# Patient Record
Sex: FEMALE | Race: WHITE | NOT HISPANIC OR LATINO
[De-identification: names, ages, dates, MRNs, and addresses within clinical notes are randomized per-mention and may not be internally consistent; named-entity substitution may affect disease eponyms.]

---

## 2018-01-23 ENCOUNTER — APPOINTMENT (OUTPATIENT)
Dept: CARDIOLOGY | Facility: CLINIC | Age: 68
End: 2018-01-23
Payer: MEDICARE

## 2018-01-23 VITALS
HEIGHT: 63 IN | SYSTOLIC BLOOD PRESSURE: 98 MMHG | DIASTOLIC BLOOD PRESSURE: 68 MMHG | BODY MASS INDEX: 39.16 KG/M2 | WEIGHT: 221 LBS | RESPIRATION RATE: 16 BRPM | HEART RATE: 75 BPM

## 2018-01-23 DIAGNOSIS — Z87.19 PERSONAL HISTORY OF OTHER DISEASES OF THE DIGESTIVE SYSTEM: ICD-10-CM

## 2018-01-23 DIAGNOSIS — Z86.69 PERSONAL HISTORY OF OTHER DISEASES OF THE NERVOUS SYSTEM AND SENSE ORGANS: ICD-10-CM

## 2018-01-23 DIAGNOSIS — K59.09 OTHER CONSTIPATION: ICD-10-CM

## 2018-01-23 DIAGNOSIS — Z82.49 FAMILY HISTORY OF ISCHEMIC HEART DISEASE AND OTHER DISEASES OF THE CIRCULATORY SYSTEM: ICD-10-CM

## 2018-01-23 PROBLEM — Z00.00 ENCOUNTER FOR PREVENTIVE HEALTH EXAMINATION: Status: ACTIVE | Noted: 2018-01-23

## 2018-01-23 PROCEDURE — 93000 ELECTROCARDIOGRAM COMPLETE: CPT | Mod: 59

## 2018-01-23 PROCEDURE — 99214 OFFICE O/P EST MOD 30 MIN: CPT

## 2018-01-23 PROCEDURE — 93224 XTRNL ECG REC UP TO 48 HRS: CPT

## 2020-02-19 ENCOUNTER — APPOINTMENT (OUTPATIENT)
Dept: CARDIOLOGY | Facility: CLINIC | Age: 70
End: 2020-02-19
Payer: MEDICARE

## 2020-02-19 ENCOUNTER — NON-APPOINTMENT (OUTPATIENT)
Age: 70
End: 2020-02-19

## 2020-02-19 VITALS
HEIGHT: 63 IN | RESPIRATION RATE: 15 BRPM | SYSTOLIC BLOOD PRESSURE: 116 MMHG | DIASTOLIC BLOOD PRESSURE: 80 MMHG | BODY MASS INDEX: 41.46 KG/M2 | HEART RATE: 69 BPM | WEIGHT: 234 LBS

## 2020-02-19 DIAGNOSIS — I49.1 ATRIAL PREMATURE DEPOLARIZATION: ICD-10-CM

## 2020-02-19 PROCEDURE — 93000 ELECTROCARDIOGRAM COMPLETE: CPT

## 2020-02-19 PROCEDURE — 99214 OFFICE O/P EST MOD 30 MIN: CPT

## 2020-02-19 NOTE — REASON FOR VISIT
[FreeTextEntry1] : The patient is a 69-year-old female here today for cardiac reevaluation with known history of mild valvular insufficiency, intermittent lightheadedness, obesity and hypothyroid.  Mrs. Rodriguez reports her PCP (Dr. Ruth) advised her to follow up with her Cardiologist regarding a slightly abnormal EKG and recent complaints of worsening lightheadedness.\par \par She admits her symptoms worsen when getting up from a seated/lying down position and recently while battling a sinus cold with congestion.  She admits to sometimes feeling palpitations but are NOT associated with any other symptoms including the lightheadedness.\par \par The patient denies any associated symptoms such as CP, SOB, PND, orthopnea, syncope, edema, diaphoresis.

## 2020-02-19 NOTE — DISCUSSION/SUMMARY
[FreeTextEntry1] : 1).  Patient will complete a Transthoracic Echocardiogram in the near future to assess cardiac function and for any signs of worsening valvulopathy or cardiomyopathy.\par \par 2).  Mrs. Rodriguez reassured there is no significant change in her EKG, blood pressure and heart rate are optimally controlled and she currently appears euvolemic.\par \par Intermittent lightheadedness most likely attributable to multiple factors including orthostatic changes, dehydration and sinus congestion.  \par \par 3).  Counseled patient on moving more slowly from a seated position to standing, maintaining good PO hydration daily and to eat a well balanced diet low in carbohydrates and fats.\par \par 4).  Immediately go to the emergency department and/or report any untoward symptoms to our office.\par \par 5).  Follow up with our office in 3 months or PRN.

## 2020-02-19 NOTE — HISTORY OF PRESENT ILLNESS
[FreeTextEntry1] : Mrs. Rodriguez did suffer from a syncopal episode back in 2018 when she took too many laxatives, devloped diarrhea and became dehydrated.\par \par She subsequently completed a 24 Hour Holter monitor in (2018) that demonstrated NSR with average rate of 75 bpm (max 112, min 56).  There were only one PVC and 8 PAC's noted.  There were NO episodes of VT, SVT, pauses or AV blocks.\par \par Pharmacologic Nuclear Stress test completed in (2017) demonstrated NO exercise induced arrhythmias and NO signs of ischemia on perfusion imaging.  She did develop some lightheadedness during the exam that resolved.

## 2020-02-19 NOTE — ASSESSMENT
[FreeTextEntry1] : EKG 2/19/2020:  The EKG illustrates sinus rhythm, rate of 69 bpm, low voltage in precordial leads, poor R wave progression V1 to V4, nonspecific negative precordial T waves.\par \par Most recent blood work (1/13/2020):  Sodium (140), Potassium (4.8), Creatinine (0.98), BUN (15), Hgb (13.1), Total Cholesterol (207), LDL (127), Triglycerides (114).

## 2020-02-19 NOTE — PHYSICAL EXAM
[Normal Appearance] : normal appearance [General Appearance - In No Acute Distress] : no acute distress [FreeTextEntry1] : Morbidly obese [Normal Conjunctiva] : the conjunctiva exhibited no abnormalities [Normal Oral Mucosa] : normal oral mucosa [Normal Jugular Venous A Waves Present] : normal jugular venous A waves present [Normal Jugular Venous V Waves Present] : normal jugular venous V waves present [No Jugular Venous Sanchez A Waves] : no jugular venous sanchez A waves [] : no respiratory distress [Auscultation Breath Sounds / Voice Sounds] : lungs were clear to auscultation bilaterally [Respiration, Rhythm And Depth] : normal respiratory rhythm and effort [Heart Rate And Rhythm] : heart rate and rhythm were normal [Heart Sounds] : normal S1 and S2 [Murmurs] : no murmurs present [Edema] : no peripheral edema present [Bowel Sounds] : normal bowel sounds [Abnormal Walk] : normal gait [Nail Clubbing] : no clubbing of the fingernails [Cyanosis, Localized] : no localized cyanosis [Skin Color & Pigmentation] : normal skin color and pigmentation [Skin Turgor] : normal skin turgor [Oriented To Time, Place, And Person] : oriented to person, place, and time [Impaired Insight] : insight and judgment were intact [Affect] : the affect was normal

## 2020-05-01 ENCOUNTER — APPOINTMENT (OUTPATIENT)
Dept: CARDIOLOGY | Facility: CLINIC | Age: 70
End: 2020-05-01

## 2020-08-14 ENCOUNTER — APPOINTMENT (OUTPATIENT)
Dept: CARDIOLOGY | Facility: CLINIC | Age: 70
End: 2020-08-14
Payer: MEDICARE

## 2020-08-14 VITALS
DIASTOLIC BLOOD PRESSURE: 88 MMHG | RESPIRATION RATE: 15 BRPM | TEMPERATURE: 98 F | HEART RATE: 68 BPM | SYSTOLIC BLOOD PRESSURE: 132 MMHG | BODY MASS INDEX: 41.64 KG/M2 | WEIGHT: 235 LBS | HEIGHT: 63 IN

## 2020-08-14 PROCEDURE — 99214 OFFICE O/P EST MOD 30 MIN: CPT

## 2020-08-14 PROCEDURE — 93000 ELECTROCARDIOGRAM COMPLETE: CPT

## 2020-08-17 NOTE — PHYSICAL EXAM
[General Appearance - Well Developed] : well developed [General Appearance - In No Acute Distress] : no acute distress [Normal Conjunctiva] : the conjunctiva exhibited no abnormalities [Eyelids - No Xanthelasma] : the eyelids demonstrated no xanthelasmas [Normal Oral Mucosa] : normal oral mucosa [No Oral Pallor] : no oral pallor [No Oral Cyanosis] : no oral cyanosis [Normal Jugular Venous A Waves Present] : normal jugular venous A waves present [Normal Jugular Venous V Waves Present] : normal jugular venous V waves present [No Jugular Venous Sanchez A Waves] : no jugular venous sanchez A waves [Respiration, Rhythm And Depth] : normal respiratory rhythm and effort [Exaggerated Use Of Accessory Muscles For Inspiration] : no accessory muscle use [Auscultation Breath Sounds / Voice Sounds] : lungs were clear to auscultation bilaterally [Heart Rate And Rhythm] : heart rate and rhythm were normal [Heart Sounds] : normal S1 and S2 [Murmurs] : no murmurs present [Abdomen Soft] : soft [Abdomen Tenderness] : non-tender [Abdomen Mass (___ Cm)] : no abdominal mass palpated [Abnormal Walk] : normal gait [Gait - Sufficient For Exercise Testing] : the gait was sufficient for exercise testing [Nail Clubbing] : no clubbing of the fingernails [Cyanosis, Localized] : no localized cyanosis [Petechial Hemorrhages (___cm)] : no petechial hemorrhages [Skin Color & Pigmentation] : normal skin color and pigmentation [] : no rash [No Venous Stasis] : no venous stasis [Skin Lesions] : no skin lesions [No Skin Ulcers] : no skin ulcer [No Xanthoma] : no  xanthoma was observed [Oriented To Time, Place, And Person] : oriented to person, place, and time [Affect] : the affect was normal [Mood] : the mood was normal [No Anxiety] : not feeling anxious [FreeTextEntry1] : Morbidly obese

## 2020-08-17 NOTE — ASSESSMENT
[FreeTextEntry1] : \par \par 1.  EKG today reveals normal sinus rhythm at 68 bpm.  Poor R-wave progression across precordium.  No ischemic changes. \par \par 2.  Hyperlipidemia:  Patient with a recent lipid profile demonstrating a total cholesterol of 207, HDL 57, TC/HDL ratio 3.6, , triglycerides 114.  Patient advised on a stricter low-fat / low-cholesterol diet as well as weight loss. \par \par 3.  Intermittent lightheadedness:  Patient with history of syncope 2-3 years ago secondary to apparent dehydration.  Remains lightheaded intermittently but without further syncope.  Will have her undergo 30-day ambulatory event monitoring along with carotid duplex and an echocardiogram to further define her presenting symptoms.  Ultimately, an exercise stress test will be performed as well.    \par

## 2020-08-17 NOTE — REASON FOR VISIT
[FreeTextEntry1] : Mrs. Rodriguez is a pleasant 69-year-old white female with a past medical history significant for syncope, GERD, and thyroid cancer, who presents for follow up evaluation.   \par \par

## 2020-08-17 NOTE — HISTORY OF PRESENT ILLNESS
[FreeTextEntry1] : From a cardiac standpoint, Mrs. Rodriguez denies exertional chest pain or shortness of breath.  She was evaluated earlier this year because of complaints of intermittent lightheadedness but did not undergo any formal testing.

## 2020-10-27 ENCOUNTER — APPOINTMENT (OUTPATIENT)
Dept: CARDIOLOGY | Facility: CLINIC | Age: 70
End: 2020-10-27
Payer: MEDICARE

## 2020-10-27 PROCEDURE — 93306 TTE W/DOPPLER COMPLETE: CPT

## 2020-11-16 ENCOUNTER — NON-APPOINTMENT (OUTPATIENT)
Age: 70
End: 2020-11-16

## 2020-11-16 ENCOUNTER — APPOINTMENT (OUTPATIENT)
Dept: CARDIOLOGY | Facility: CLINIC | Age: 70
End: 2020-11-16
Payer: MEDICARE

## 2020-11-16 VITALS
SYSTOLIC BLOOD PRESSURE: 124 MMHG | DIASTOLIC BLOOD PRESSURE: 88 MMHG | TEMPERATURE: 97.3 F | HEART RATE: 65 BPM | BODY MASS INDEX: 39.51 KG/M2 | WEIGHT: 223 LBS | HEIGHT: 63 IN | RESPIRATION RATE: 16 BRPM

## 2020-11-16 PROCEDURE — 93000 ELECTROCARDIOGRAM COMPLETE: CPT

## 2020-11-16 PROCEDURE — 99214 OFFICE O/P EST MOD 30 MIN: CPT

## 2020-11-16 NOTE — HISTORY OF PRESENT ILLNESS
[FreeTextEntry1] : Mrs. Rodriguez presents today for follow up evalutation of her intermittent lightheadedness and occasional palpitations as well as results of her recent echocardiogram  She is presently without complaints of chest pain, shortness of breath, or syncope.  She does continue to have intermittent lightheadedness, however, not as frequently as before, and occasional palpitations.  States she drinks one cup of coffee daily and hydrates well throughout the day.

## 2020-11-16 NOTE — DISCUSSION/SUMMARY
[FreeTextEntry1] : Case and plan discussed with Dr. De.\par \par 1 - Intermittent lightheadedness:  Patient with history of syncope approximately 3 years ago, believed to be secondary to dehydration.  No further episodes, however, she does experience intermittent lightheadedness.  Patient will undergo 30-day ambulatory event monitoring, carotid duplex as well as a 2-day pharmacologic nuclear stress test.\par Echocardiogram (10/27/2020):  EF 60%.  Mildly dilated left atrium.  LA Volume index 35ml/ml2. Mild to moderate aortic valve sclerosis/calcification without any evidence of aortic stenosis.  Mild to moderate aortic regurgitation.  Mild mitral annular calcification.  Trace mitral valve regurgitation.  Trace pulmonic valve regurgitation.  Mildly elevated pulmonary artery systolic pressure.\par \par 2 - Follow up with Dr. De in 6-8 weeks.

## 2020-11-16 NOTE — REASON FOR VISIT
[FreeTextEntry1] : The patient is a pleasant 70-year-old white female with a past medical history significant for syncope, GERD, and thyroid cancer, who presents for follow up evaluation.

## 2020-12-29 ENCOUNTER — APPOINTMENT (OUTPATIENT)
Dept: CARDIOLOGY | Facility: CLINIC | Age: 70
End: 2020-12-29
Payer: MEDICARE

## 2020-12-29 PROCEDURE — 93880 EXTRACRANIAL BILAT STUDY: CPT

## 2021-01-08 ENCOUNTER — MED ADMIN CHARGE (OUTPATIENT)
Age: 71
End: 2021-01-08

## 2021-01-08 ENCOUNTER — APPOINTMENT (OUTPATIENT)
Dept: CARDIOLOGY | Facility: CLINIC | Age: 71
End: 2021-01-08
Payer: MEDICARE

## 2021-01-08 PROCEDURE — 78452 HT MUSCLE IMAGE SPECT MULT: CPT

## 2021-01-08 PROCEDURE — 93015 CV STRESS TEST SUPVJ I&R: CPT

## 2021-01-08 PROCEDURE — A9500: CPT

## 2021-01-08 RX ADMIN — REGADENOSON 0 MG/5ML: 0.08 INJECTION, SOLUTION INTRAVENOUS at 00:00

## 2021-01-08 RX ADMIN — AMINOPHYLLINE 0 MG/ML: 25 INJECTION, SOLUTION INTRAVENOUS at 00:00

## 2021-01-11 ENCOUNTER — APPOINTMENT (OUTPATIENT)
Dept: CARDIOLOGY | Facility: CLINIC | Age: 71
End: 2021-01-11

## 2021-01-15 ENCOUNTER — NON-APPOINTMENT (OUTPATIENT)
Age: 71
End: 2021-01-15

## 2021-01-15 ENCOUNTER — APPOINTMENT (OUTPATIENT)
Dept: CARDIOLOGY | Facility: CLINIC | Age: 71
End: 2021-01-15
Payer: MEDICARE

## 2021-01-15 VITALS
WEIGHT: 222 LBS | HEART RATE: 97 BPM | DIASTOLIC BLOOD PRESSURE: 82 MMHG | HEIGHT: 63 IN | RESPIRATION RATE: 16 BRPM | TEMPERATURE: 96.6 F | SYSTOLIC BLOOD PRESSURE: 130 MMHG | BODY MASS INDEX: 39.34 KG/M2

## 2021-01-15 DIAGNOSIS — I38 ENDOCARDITIS, VALVE UNSPECIFIED: ICD-10-CM

## 2021-01-15 DIAGNOSIS — I77.9 DISORDER OF ARTERIES AND ARTERIOLES, UNSPECIFIED: ICD-10-CM

## 2021-01-15 PROCEDURE — 93000 ELECTROCARDIOGRAM COMPLETE: CPT

## 2021-01-15 PROCEDURE — 99214 OFFICE O/P EST MOD 30 MIN: CPT

## 2021-01-15 RX ORDER — REGADENOSON 0.08 MG/ML
0.4 INJECTION, SOLUTION INTRAVENOUS
Qty: 4 | Refills: 0 | Status: COMPLETED | OUTPATIENT
Start: 2021-01-08

## 2021-01-15 RX ORDER — AMINOPHYLLINE 25 MG/ML
25 INJECTION, SOLUTION INTRAVENOUS
Qty: 0 | Refills: 0 | Status: COMPLETED | OUTPATIENT
Start: 2021-01-08

## 2021-01-15 RX ORDER — LUBIPROSTONE 24 UG/1
24 CAPSULE, GELATIN COATED ORAL
Refills: 0 | Status: DISCONTINUED | COMMUNITY
End: 2021-01-15

## 2021-01-19 NOTE — PHYSICAL EXAM
[General Appearance - Well Developed] : well developed [General Appearance - In No Acute Distress] : no acute distress [Normal Conjunctiva] : the conjunctiva exhibited no abnormalities [Eyelids - No Xanthelasma] : the eyelids demonstrated no xanthelasmas [Normal Oral Mucosa] : normal oral mucosa Patient [No Oral Pallor] : no oral pallor [No Oral Cyanosis] : no oral cyanosis [Normal Jugular Venous A Waves Present] : normal jugular venous A waves present [Normal Jugular Venous V Waves Present] : normal jugular venous V waves present [No Jugular Venous Sanchez A Waves] : no jugular venous sanchez A waves [Respiration, Rhythm And Depth] : normal respiratory rhythm and effort [Exaggerated Use Of Accessory Muscles For Inspiration] : no accessory muscle use [Auscultation Breath Sounds / Voice Sounds] : lungs were clear to auscultation bilaterally [Heart Sounds] : normal S1 and S2 [Heart Rate And Rhythm] : heart rate and rhythm were normal [Murmurs] : no murmurs present [Abdomen Soft] : soft [Abdomen Tenderness] : non-tender [Abdomen Mass (___ Cm)] : no abdominal mass palpated [Abnormal Walk] : normal gait [Gait - Sufficient For Exercise Testing] : the gait was sufficient for exercise testing [Nail Clubbing] : no clubbing of the fingernails [Cyanosis, Localized] : no localized cyanosis [Petechial Hemorrhages (___cm)] : no petechial hemorrhages [Skin Color & Pigmentation] : normal skin color and pigmentation [] : no rash [No Venous Stasis] : no venous stasis [Skin Lesions] : no skin lesions [No Xanthoma] : no  xanthoma was observed [No Skin Ulcers] : no skin ulcer [Oriented To Time, Place, And Person] : oriented to person, place, and time [Affect] : the affect was normal [Mood] : the mood was normal [No Anxiety] : not feeling anxious [FreeTextEntry1] : Morbidly obese

## 2021-01-19 NOTE — HISTORY OF PRESENT ILLNESS
[FreeTextEntry1] :  Mrs. Rodriguez presents today without complaints of exertional chest pain, shortness of breath, palpitations, lightheadedness, or syncope.  She has had intermittent lightheadedness in the past as well as palpitations and underwent a non-invasive cardiac evaluation.

## 2021-01-19 NOTE — REASON FOR VISIT
[FreeTextEntry1] : Mrs. Rodriguez is a pleasant 70-year-old white female with a past medical history significant for thyroid cancer, GERD, and syncope, who presents for follow up evaluation.\par

## 2021-01-24 DIAGNOSIS — Z01.818 ENCOUNTER FOR OTHER PREPROCEDURAL EXAMINATION: ICD-10-CM

## 2021-01-25 ENCOUNTER — APPOINTMENT (OUTPATIENT)
Dept: DISASTER EMERGENCY | Facility: CLINIC | Age: 71
End: 2021-01-25

## 2021-01-27 LAB — SARS-COV-2 N GENE NPH QL NAA+PROBE: NOT DETECTED

## 2021-02-18 RX ORDER — KIT FOR THE PREPARATION OF TECHNETIUM TC99M SESTAMIBI 1 MG/5ML
INJECTION, POWDER, LYOPHILIZED, FOR SOLUTION PARENTERAL
Refills: 0 | Status: COMPLETED | OUTPATIENT
Start: 2021-02-18

## 2021-02-18 RX ADMIN — KIT FOR THE PREPARATION OF TECHNETIUM TC99M SESTAMIBI 0: 1 INJECTION, POWDER, LYOPHILIZED, FOR SOLUTION PARENTERAL at 00:00

## 2021-03-17 RX ORDER — FLUTICASONE PROPIONATE 50 UG/1
50 SPRAY, METERED NASAL
Qty: 16 | Refills: 0 | Status: DISCONTINUED | COMMUNITY
Start: 2020-09-17 | End: 2021-03-17

## 2021-03-18 ENCOUNTER — NON-APPOINTMENT (OUTPATIENT)
Age: 71
End: 2021-03-18

## 2021-03-18 ENCOUNTER — APPOINTMENT (OUTPATIENT)
Dept: ELECTROPHYSIOLOGY | Facility: CLINIC | Age: 71
End: 2021-03-18
Payer: MEDICARE

## 2021-03-18 VITALS
WEIGHT: 220 LBS | DIASTOLIC BLOOD PRESSURE: 80 MMHG | OXYGEN SATURATION: 100 % | HEART RATE: 82 BPM | BODY MASS INDEX: 40.48 KG/M2 | HEIGHT: 62 IN | RESPIRATION RATE: 16 BRPM | SYSTOLIC BLOOD PRESSURE: 134 MMHG

## 2021-03-18 PROCEDURE — 93000 ELECTROCARDIOGRAM COMPLETE: CPT

## 2021-03-18 PROCEDURE — 99205 OFFICE O/P NEW HI 60 MIN: CPT

## 2021-03-18 NOTE — REASON FOR VISIT
[Consultation] : a consultation regarding [Supraventricular Tachycardia] : supraventricular tachycardia [Syncope] : syncope [FreeTextEntry1] : ref Dr De [Spouse] : spouse

## 2021-03-18 NOTE — HISTORY OF PRESENT ILLNESS
[FreeTextEntry1] : 70 year old woman with history of thyroid cancer, GERD, presenting for evaluation of palpitation and syncope. \par \par She has had intermittent episodes of lightheadedness, which she describes as sudden in onset, and feeling as if she would pass out. The sensations last seconds, and resolve. Over a year ago she had a severe episode after going to the bathroom that resulted in kristi syncope. She does not recall having chest pain or palpitation during these episodes.  \par \par At other times she reports episodes of palpitation, which are also brief, and feel like a fluttering.  \par \par An event monitor 11/21- 12/20/20 revealed sinus rhythm with average HR 69 bpm, and brief episodes of SVT, which were nonsustained, some of which were regular and without visible P-waves, with HRs around 154 bpm, and some nonsustained and irregular c/w brief AT/AF. She reports having had brief episodes of lightheadedness during monitoring.  \par \par A prior Holter in 1/2018 was essentially normal with rare ectopy. \par \par TTE and stress test have been unremarkable. \par \par She has not been on medical therapy for these issues.  \par \par

## 2021-03-18 NOTE — PHYSICAL EXAM
[General Appearance - Well Developed] : well developed [Well Groomed] : well groomed [General Appearance - In No Acute Distress] : no acute distress [FreeTextEntry1] : obese [Normal Conjunctiva] : the conjunctiva exhibited no abnormalities [Eyelids - No Xanthelasma] : the eyelids demonstrated no xanthelasmas [Normal Oral Mucosa] : normal oral mucosa [No Oral Pallor] : no oral pallor [No Oral Cyanosis] : no oral cyanosis [Normal Jugular Venous A Waves Present] : normal jugular venous A waves present [Normal Jugular Venous V Waves Present] : normal jugular venous V waves present [No Jugular Venous Sanchez A Waves] : no jugular venous sanchez A waves [Heart Rate And Rhythm] : heart rate and rhythm were normal [Heart Sounds] : normal S1 and S2 [Murmurs] : no murmurs present [Respiration, Rhythm And Depth] : normal respiratory rhythm and effort [Exaggerated Use Of Accessory Muscles For Inspiration] : no accessory muscle use [Auscultation Breath Sounds / Voice Sounds] : lungs were clear to auscultation bilaterally [Abdomen Soft] : soft [Abdomen Tenderness] : non-tender [Abdomen Mass (___ Cm)] : no abdominal mass palpated [Abnormal Walk] : normal gait [Gait - Sufficient For Exercise Testing] : the gait was sufficient for exercise testing [Nail Clubbing] : no clubbing of the fingernails [Cyanosis, Localized] : no localized cyanosis [Petechial Hemorrhages (___cm)] : no petechial hemorrhages [Skin Color & Pigmentation] : normal skin color and pigmentation [] : no rash [No Venous Stasis] : no venous stasis [Skin Lesions] : no skin lesions [No Skin Ulcers] : no skin ulcer [No Xanthoma] : no  xanthoma was observed [Oriented To Time, Place, And Person] : oriented to person, place, and time [Affect] : the affect was normal [Mood] : the mood was normal [No Anxiety] : not feeling anxious

## 2021-03-18 NOTE — DISCUSSION/SUMMARY
[FreeTextEntry1] : 70 year old woman with history of thyroid cancer, GERD, presenting for evaluation of palpitation and syncope. She has had episodes of sudden lightheadedness with presyncope and syncope in the past, as well as episodes of brief palpitation. Monitoring has revealed short episodes of SVT, which may be a reentrant arrhythmia, but paroxysmal atrial fibrillation is also suspected, and her more severe symptoms did not occur on monitoring. Given the severity of her symptoms including syncope, further evaluation is indicated to clarify if she has an underlying arrhythmia causing her symptoms, and to guide management. If an SVT is clarified, EP Study and ablation may be her best option, vs consideration of medical therapy if tolerated. In addition, if she does have AF, anticoagulation is indicated in addition to symptomatic management. We did discuss the potential for EP study to clarify, but she would prefer further monitoring to clarify the diagnosis prior to invasive procedure. An ILR implant would be most useful for long-term monitoring, to correlate with symptoms, and guide further management. The risks and benefits of ILR implant were reviewed, and she expressed understanding.  \par \par -ILR implant.  \par \par -EP followup after above.

## 2021-04-02 ENCOUNTER — APPOINTMENT (OUTPATIENT)
Dept: DISASTER EMERGENCY | Facility: CLINIC | Age: 71
End: 2021-04-02

## 2021-04-03 LAB — SARS-COV-2 N GENE NPH QL NAA+PROBE: NOT DETECTED

## 2021-04-05 ENCOUNTER — TRANSCRIPTION ENCOUNTER (OUTPATIENT)
Age: 71
End: 2021-04-05

## 2021-04-05 ENCOUNTER — OUTPATIENT (OUTPATIENT)
Dept: OUTPATIENT SERVICES | Facility: HOSPITAL | Age: 71
LOS: 1 days | Discharge: HOME | End: 2021-04-05
Payer: MEDICARE

## 2021-04-05 VITALS
DIASTOLIC BLOOD PRESSURE: 78 MMHG | TEMPERATURE: 98 F | SYSTOLIC BLOOD PRESSURE: 136 MMHG | RESPIRATION RATE: 16 BRPM | HEART RATE: 69 BPM

## 2021-04-05 DIAGNOSIS — I47.1 SUPRAVENTRICULAR TACHYCARDIA: ICD-10-CM

## 2021-04-05 PROCEDURE — C1764: CPT

## 2021-04-05 PROCEDURE — 33285 INSJ SUBQ CAR RHYTHM MNTR: CPT

## 2021-04-05 RX ORDER — CEPHALEXIN 500 MG
500 CAPSULE ORAL ONCE
Refills: 0 | Status: COMPLETED | OUTPATIENT
Start: 2021-04-05 | End: 2021-04-05

## 2021-04-05 RX ORDER — LEVOTHYROXINE SODIUM 125 MCG
1 TABLET ORAL
Qty: 0 | Refills: 0 | DISCHARGE

## 2021-04-05 RX ADMIN — Medication 500 MILLIGRAM(S): at 08:00

## 2021-04-05 NOTE — DISCHARGE NOTE NURSING/CASE MANAGEMENT/SOCIAL WORK - PATIENT PORTAL LINK FT
You can access the FollowMyHealth Patient Portal offered by [facility] by registering at the following website: [email or web address]. By joining [facility]’s FollowMyHealth portal, you will also be able to view your health information using other applications (apps) compatible with our system.

## 2021-04-05 NOTE — DISCHARGE NOTE PROVIDER - NSDCFUSCHEDAPPT_GEN_ALL_CORE_FT
MARGRET AUGUSTE ; 04/16/2021 ; NPP Cardiology [address]  MARGRET AUGUSTE ; 04/18/2021 ; [place] [address] [facility]

## 2021-04-05 NOTE — DISCHARGE NOTE PROVIDER - NSDCFUADDINST_GEN_ALL_CORE_FT
Follow up with Dr. Polo in two weeks time. The office will call you with an appointment in 3-5 days time.

## 2021-04-05 NOTE — H&P PST ADULT - HISTORY OF PRESENT ILLNESS
70 year old woman with history of thyroid cancer, GERD, who presents with complaints of palpitations and syncope. She has had intermittent episodes of lightheadedness, which she describes as sudden in onset, and feeling as if she would pass out. The sensations last seconds, and resolve. Over a year ago she had a severe episode after going to the bathroom that resulted in kristi syncope. She does not recall having chest pain or palpitation during these episodes. At other times she reports episodes of palpitation, which are also brief, and feel like a fluttering. Event monitor worn from 11/21- 12/20/20 revealed sinus rhythm with average HR 69 bpm, and brief episodes of SVT, which were nonsustained, some of which were regular and without visible P-waves, with HRs around 154 bpm, and some nonsustained and irregular c/w brief AT/AF. She reports having had brief episodes of lightheadedness during monitoring. She has not been on medical therapy for these issues.      Cardiology Summary  Stress Test: 1/8/21, normal perfusion, LVEF 68%   Echo: 10/27/20, LVEF 60%, mild LA dilatation, mild-mod AV sclerosis, no AS, mild-mod AI, trace MR

## 2021-04-05 NOTE — DISCHARGE NOTE PROVIDER - HOSPITAL COURSE
70 year old woman with history of thyroid cancer, GERD, who presents with complaints of palpitations and syncope who is s/p successful loop recorder insertion.

## 2021-04-05 NOTE — DISCHARGE NOTE PROVIDER - CARE PROVIDER_API CALL
Juan C Polo)  Cardiology; Internal Medicine  [address], [address]  [place] [address]  Phone: [phone]  Fax: [phone]  Established Patient  Follow Up Time: 2 weeks    Cr De)  Cardiovascular Disease; Internal Medicine  [address] [place] [address]  Phone: [phone]  Fax: [phone]  Established Patient  Follow Up Time: Routine    Geovanni Ruth (DO)  Internal Medicine  [address], [address]  [place] [address]  Phone: [phone]  Fax: [phone]  Established Patient  Follow Up Time: Routine

## 2021-04-05 NOTE — H&P PST ADULT - ASSESSMENT
70 year old woman with history of thyroid cancer, GERD, who presents with complaints of palpitations and syncope who presents for elective loop recorder insertion    - COVID negative on 4/2/21

## 2021-04-05 NOTE — DISCHARGE NOTE PROVIDER - CARE PROVIDERS DIRECT ADDRESSES
,[email or web address]

## 2021-04-05 NOTE — DISCHARGE NOTE PROVIDER - PROVIDER TOKENS
PROVIDER:[TOKEN:[[id number]],FOLLOWUP:[2 weeks],ESTABLISHEDPATIENT:[T]],PROVIDER:[TOKEN:[[id number]],FOLLOWUP:[Routine],ESTABLISHEDPATIENT:[T]],PROVIDER:[TOKEN:[[id number]],FOLLOWUP:[Routine],ESTABLISHEDPATIENT:[T]]

## 2021-04-08 PROBLEM — K21.9 GASTRO-ESOPHAGEAL REFLUX DISEASE WITHOUT ESOPHAGITIS: Chronic | Status: ACTIVE | Noted: 2021-04-05

## 2021-04-08 PROBLEM — C73 MALIGNANT NEOPLASM OF THYROID GLAND: Chronic | Status: ACTIVE | Noted: 2021-04-05

## 2021-04-14 DIAGNOSIS — R00.2 PALPITATIONS: ICD-10-CM

## 2021-04-16 ENCOUNTER — APPOINTMENT (OUTPATIENT)
Dept: CARDIOLOGY | Facility: CLINIC | Age: 71
End: 2021-04-16
Payer: MEDICARE

## 2021-04-16 VITALS
RESPIRATION RATE: 16 BRPM | WEIGHT: 222 LBS | DIASTOLIC BLOOD PRESSURE: 78 MMHG | SYSTOLIC BLOOD PRESSURE: 118 MMHG | TEMPERATURE: 97.9 F | HEIGHT: 62 IN | BODY MASS INDEX: 40.85 KG/M2 | OXYGEN SATURATION: 98 % | HEART RATE: 71 BPM

## 2021-04-16 PROCEDURE — 99214 OFFICE O/P EST MOD 30 MIN: CPT

## 2021-04-16 PROCEDURE — 93000 ELECTROCARDIOGRAM COMPLETE: CPT

## 2021-04-18 ENCOUNTER — APPOINTMENT (OUTPATIENT)
Dept: DISASTER EMERGENCY | Facility: CLINIC | Age: 71
End: 2021-04-18

## 2021-04-19 LAB — SARS-COV-2 N GENE NPH QL NAA+PROBE: NOT DETECTED

## 2021-04-20 ENCOUNTER — NON-APPOINTMENT (OUTPATIENT)
Age: 71
End: 2021-04-20

## 2021-04-20 ENCOUNTER — APPOINTMENT (OUTPATIENT)
Dept: ELECTROPHYSIOLOGY | Facility: CLINIC | Age: 71
End: 2021-04-20
Payer: MEDICARE

## 2021-04-20 VITALS
RESPIRATION RATE: 17 BRPM | SYSTOLIC BLOOD PRESSURE: 124 MMHG | BODY MASS INDEX: 40.85 KG/M2 | TEMPERATURE: 97.8 F | OXYGEN SATURATION: 97 % | WEIGHT: 222 LBS | HEART RATE: 69 BPM | DIASTOLIC BLOOD PRESSURE: 80 MMHG | HEIGHT: 62 IN

## 2021-04-20 PROCEDURE — 99213 OFFICE O/P EST LOW 20 MIN: CPT

## 2021-04-20 NOTE — DISCUSSION/SUMMARY
[FreeTextEntry1] : Ms. Rodriguez is a 70 y/f with history of thyroid CA, with recurrent episodes of palpitations, lightheadedness, and syncope. An event monitor 12/2020 revealed brief episodes of SVT, and possible brief PAF, which did not correlate with symptoms. She is now s/p ILR implant for ongoing arrhythmia monitoring to correlate with symptoms, evaluate for arrhythmic etiology of syncope, and guide further management on 4/5/21. \par \par Since procedure she reports she has been feeling well but has been experiencing lightheadedness which has been going on for years. Symptom recordings to date correlate with SR, ST 100s. No other events noted since implant. \par \par Recommendation: \par \par -Site care, procedure for making symptom recordings,and remote follow up reviewed. Currently enrolled in our clinic, will d/w Dr.San watkins's office. \par -EP follow up in 6 months or sooner PRN.\par \par Danielle Soliz ANP-C

## 2021-04-20 NOTE — REVIEW OF SYSTEMS
[Headache] : no headache [Feeling Fatigued] : not feeling fatigued [Shortness Of Breath] : no shortness of breath [Dyspnea on exertion] : not dyspnea during exertion [Chest  Pressure] : no chest pressure [Chest Pain] : no chest pain [Lower Ext Edema] : no extremity edema [Palpitations] : no palpitations [Dizziness] : dizziness

## 2021-04-20 NOTE — HISTORY OF PRESENT ILLNESS
[FreeTextEntry1] : From a cardiac standpoint, Mrs. Tiwari remains reasonably stable denying chest pain, shortness of breath, or further syncope.  She has undergone 30-day ambulatory event monitoring which revealed short bursts of SVT.  She recently underwent implantable loop recorder in order to better assess her underlying rhythm.  Patient does admit to intermittent episodes of palpitations and lightheadedness which do not appear to be related to exertion and can occur at rest.  Symptoms last only a few seconds at a time.

## 2021-04-20 NOTE — REASON FOR VISIT
[FreeTextEntry1] : Mrs. Rodriguez is a pleasant 70-year-old white female with a past medical history significant for thyroid cancer, GERD, and syncope, who presents for follow up evaluation.  \par \par

## 2021-04-20 NOTE — HISTORY OF PRESENT ILLNESS
[de-identified] : Ms. Rodriguez is a 70 y/f with history of thyroid CA, with recurrent episodes of palpitations, lightheadedness, and syncope. An event monitor 12/2020 revealed brief episodes of SVT, and possible brief PAF, which did not correlate with symptoms. She is now s/p ILR implant for ongoing arrhythmia monitoring to correlate with symptoms, evaluate for arrhythmic etiology of syncope, and guide further management on 4/5/21. \par \par Since procedure she reports she has been feeling well but has been experiencing lightheadedness which has been going on for years. Symptom recordings to date correlate with SR, ST 100s. No other events noted since implant. \par \par Ref: Dr. De

## 2021-04-20 NOTE — PHYSICAL EXAM
[General Appearance - Well Developed] : well developed [Clean] : clean [Dry] : dry [Healing Well] : healing well [FreeTextEntry1] : Left parasternal

## 2021-04-20 NOTE — ASSESSMENT
[FreeTextEntry1] : 1.  EKG today reveals sinus rhythm with APCs at 71 bpm.  Normal intervals.  Poor R-wave progression leads V1 through V3.  No ischemic changes.  \par \par 2.  Palpitations/syncope:  Patient recently received an implanted loop recorder and is now being monitored closely by EP regarding her arrhythmia.  She is advised to continue current medications as well as follow a sensible diet and walk in order to lose weight.  If clinically stable, office visit three months. \par

## 2021-05-12 ENCOUNTER — APPOINTMENT (OUTPATIENT)
Dept: ELECTROPHYSIOLOGY | Facility: CLINIC | Age: 71
End: 2021-05-12

## 2021-05-12 ENCOUNTER — APPOINTMENT (OUTPATIENT)
Dept: CARDIOLOGY | Facility: CLINIC | Age: 71
End: 2021-05-12
Payer: MEDICARE

## 2021-05-12 PROCEDURE — 93298 REM INTERROG DEV EVAL SCRMS: CPT

## 2021-05-12 PROCEDURE — G2066: CPT

## 2021-06-16 ENCOUNTER — APPOINTMENT (OUTPATIENT)
Dept: ELECTROPHYSIOLOGY | Facility: CLINIC | Age: 71
End: 2021-06-16

## 2021-06-16 ENCOUNTER — APPOINTMENT (OUTPATIENT)
Dept: CARDIOLOGY | Facility: CLINIC | Age: 71
End: 2021-06-16
Payer: MEDICARE

## 2021-06-16 PROCEDURE — G2066: CPT

## 2021-06-16 PROCEDURE — 93298 REM INTERROG DEV EVAL SCRMS: CPT

## 2021-07-13 ENCOUNTER — APPOINTMENT (OUTPATIENT)
Dept: CARDIOLOGY | Facility: CLINIC | Age: 71
End: 2021-07-13
Payer: MEDICARE

## 2021-07-13 VITALS
HEIGHT: 63 IN | RESPIRATION RATE: 15 BRPM | BODY MASS INDEX: 39.16 KG/M2 | SYSTOLIC BLOOD PRESSURE: 106 MMHG | WEIGHT: 221 LBS | DIASTOLIC BLOOD PRESSURE: 72 MMHG | HEART RATE: 66 BPM

## 2021-07-13 PROCEDURE — 93000 ELECTROCARDIOGRAM COMPLETE: CPT

## 2021-07-13 PROCEDURE — 99214 OFFICE O/P EST MOD 30 MIN: CPT

## 2021-07-15 NOTE — PHYSICAL EXAM
[General Appearance - Well Developed] : well developed [General Appearance - In No Acute Distress] : no acute distress [Normal Conjunctiva] : the conjunctiva exhibited no abnormalities [Eyelids - No Xanthelasma] : the eyelids demonstrated no xanthelasmas [Normal Oral Mucosa] : normal oral mucosa [No Oral Pallor] : no oral pallor [No Oral Cyanosis] : no oral cyanosis [Normal Jugular Venous A Waves Present] : normal jugular venous A waves present [Normal Jugular Venous V Waves Present] : normal jugular venous V waves present [No Jugular Venous Sanchez A Waves] : no jugular venous sanhcez A waves [Respiration, Rhythm And Depth] : normal respiratory rhythm and effort [Exaggerated Use Of Accessory Muscles For Inspiration] : no accessory muscle use [Auscultation Breath Sounds / Voice Sounds] : lungs were clear to auscultation bilaterally [Heart Rate And Rhythm] : heart rate and rhythm were normal [Heart Sounds] : normal S1 and S2 [Murmurs] : no murmurs present [Abdomen Soft] : soft [Abdomen Tenderness] : non-tender [Abdomen Mass (___ Cm)] : no abdominal mass palpated [Abnormal Walk] : normal gait [Gait - Sufficient For Exercise Testing] : the gait was sufficient for exercise testing [Nail Clubbing] : no clubbing of the fingernails [Cyanosis, Localized] : no localized cyanosis [Petechial Hemorrhages (___cm)] : no petechial hemorrhages [Skin Color & Pigmentation] : normal skin color and pigmentation [] : no rash [No Venous Stasis] : no venous stasis [Skin Lesions] : no skin lesions [No Skin Ulcers] : no skin ulcer [No Xanthoma] : no  xanthoma was observed [Oriented To Time, Place, And Person] : oriented to person, place, and time [Affect] : the affect was normal [Mood] : the mood was normal [No Anxiety] : not feeling anxious [FreeTextEntry1] : Morbidly obese

## 2021-07-15 NOTE — REASON FOR VISIT
[FreeTextEntry1] : Mrs. Rodriguez is a pleasant 70-year-old white female with a past medical history significant for thyroid cancer, GERD, and episodes of palpitations associated with lightheadedness and syncope.  She underwent event monitoring in December of last year which revealed brief episodes of SVT and possibly paroxysmal atrial fibrillation.  Since that time, she has undergone implantation of a loop recorder which is currently monitoring her rhythm.  \par \par

## 2021-07-15 NOTE — ASSESSMENT
[FreeTextEntry1] : 1.  EKG today reveals sinus rhythm at 66 bpm with sinus arrhythmia.  Poor R-wave progression leads V1 through V4.  No ischemic changes. \par \par 2.  Palpitations, lightheadedness, or syncope:  Clinically stable at this point.  Event monitor suggestive of SVT and possibly PAF.  Loop recorder to date has not revealed significant arrhythmia.  Patient will continue to be monitored closely.  \par \par 3.  Review of recent lipid profile demonstrates a total cholesterol of 213, HDL 53, TC/HDL ratio 4.0, , triglycerides 137.  Patient is advised on a stricter low-fat / low-cholesterol diet.  Fasting bloodwork prior to next office visit.    \par

## 2021-07-15 NOTE — HISTORY OF PRESENT ILLNESS
[FreeTextEntry1] : From a cardiac standpoint, Mrs. Rodriguez remains reasonably stable denying chest pain, shortness of breath, or other symptoms.  Her loop recorder to date has been relatively “arrhythmia silent.”

## 2021-07-21 ENCOUNTER — APPOINTMENT (OUTPATIENT)
Dept: ELECTROPHYSIOLOGY | Facility: CLINIC | Age: 71
End: 2021-07-21

## 2021-07-21 ENCOUNTER — APPOINTMENT (OUTPATIENT)
Dept: CARDIOLOGY | Facility: CLINIC | Age: 71
End: 2021-07-21
Payer: MEDICARE

## 2021-07-21 PROCEDURE — G2066: CPT

## 2021-07-21 PROCEDURE — 93298 REM INTERROG DEV EVAL SCRMS: CPT

## 2021-08-25 ENCOUNTER — APPOINTMENT (OUTPATIENT)
Dept: ELECTROPHYSIOLOGY | Facility: CLINIC | Age: 71
End: 2021-08-25

## 2021-08-27 ENCOUNTER — APPOINTMENT (OUTPATIENT)
Dept: CARDIOLOGY | Facility: CLINIC | Age: 71
End: 2021-08-27
Payer: MEDICARE

## 2021-08-27 PROCEDURE — G2066: CPT

## 2021-08-27 PROCEDURE — 93298 REM INTERROG DEV EVAL SCRMS: CPT

## 2021-09-29 ENCOUNTER — APPOINTMENT (OUTPATIENT)
Dept: CARDIOLOGY | Facility: CLINIC | Age: 71
End: 2021-09-29
Payer: MEDICARE

## 2021-09-29 PROCEDURE — 93298 REM INTERROG DEV EVAL SCRMS: CPT

## 2021-09-29 PROCEDURE — G2066: CPT

## 2021-10-13 ENCOUNTER — APPOINTMENT (OUTPATIENT)
Dept: CARDIOLOGY | Facility: CLINIC | Age: 71
End: 2021-10-13
Payer: MEDICARE

## 2021-10-13 VITALS
BODY MASS INDEX: 37.03 KG/M2 | HEART RATE: 64 BPM | RESPIRATION RATE: 16 BRPM | SYSTOLIC BLOOD PRESSURE: 102 MMHG | DIASTOLIC BLOOD PRESSURE: 60 MMHG | HEIGHT: 63 IN | WEIGHT: 209 LBS

## 2021-10-13 DIAGNOSIS — Z85.850 PERSONAL HISTORY OF MALIGNANT NEOPLASM OF THYROID: ICD-10-CM

## 2021-10-13 PROCEDURE — 99214 OFFICE O/P EST MOD 30 MIN: CPT

## 2021-10-13 PROCEDURE — 93000 ELECTROCARDIOGRAM COMPLETE: CPT

## 2021-10-15 NOTE — ASSESSMENT
[FreeTextEntry1] : 1.  EKG today demonstrates sinus rhythm with marked sinus node arrhythmia at 64 bpm.  Normal intervals.  Poor R-wave progression across leads V1 through V3.  No ischemic changes.  \par \par 2.  SVT (? PAF):  Patient currently wearing an implanted loop recorder which is monitoring her heart rhythm.  To date, there has been no evidence of atrial fibrillation.  Patient continues to be monitored.  The implant date on the loop is April 2021, so she will likely wear this for the next 2 ½ years.   \par \par 3.  Review of recent bloodwork did not include a lipid profile.  Patient advised on a strict low-fat / low-cholesterol diet.  Will undergo fasting lipid profile in the next few months prior to her return office visit in three months. \par

## 2021-10-15 NOTE — REASON FOR VISIT
[FreeTextEntry1] : Mrs. Rodriguez is a pleasant 71-year-old white female with a past medical history significant for thyroid cancer, GERD, palpitations associated with lightheadedness and syncope for which she currently has an implanted loop recorder and is actively being monitored.  Patient has had brief episodes of SVT and possibly paroxysms of atrial fibrillation. \par

## 2021-10-15 NOTE — HISTORY OF PRESENT ILLNESS
[FreeTextEntry1] : \par From a cardiac standpoint, Mrs. Rodriguez denies exertional chest pain, shortness of breath, palpitations, lightheadedness, or syncope.  \par

## 2021-10-21 ENCOUNTER — APPOINTMENT (OUTPATIENT)
Dept: ELECTROPHYSIOLOGY | Facility: CLINIC | Age: 71
End: 2021-10-21
Payer: MEDICARE

## 2021-10-21 ENCOUNTER — NON-APPOINTMENT (OUTPATIENT)
Age: 71
End: 2021-10-21

## 2021-10-21 VITALS
HEIGHT: 63 IN | TEMPERATURE: 97.7 F | HEART RATE: 71 BPM | OXYGEN SATURATION: 96 % | RESPIRATION RATE: 16 BRPM | WEIGHT: 215 LBS | SYSTOLIC BLOOD PRESSURE: 115 MMHG | DIASTOLIC BLOOD PRESSURE: 60 MMHG | BODY MASS INDEX: 38.09 KG/M2

## 2021-10-21 PROCEDURE — 93000 ELECTROCARDIOGRAM COMPLETE: CPT

## 2021-10-21 PROCEDURE — 99214 OFFICE O/P EST MOD 30 MIN: CPT

## 2021-10-21 NOTE — HISTORY OF PRESENT ILLNESS
[de-identified] : Ms. Rodriguez is a 71 y/f with history of thyroid CA, with recurrent episodes of palpitations, lightheadedness, and syncope. An event monitor 12/2020 revealed brief episodes of SVT, and possible brief PAF, which did not correlate with symptoms. She is now s/p ILR implant for ongoing arrhythmia monitoring to correlate with symptoms, evaluate for arrhythmic etiology of syncope, and guide further management on 4/5/21. \par \par Remote followed by Dr. De. ILR has revealed many symptom recordings of lightheadedness have correlated with SR with rare APCs.  On two occasions there have been brief bursts of probable PAT one slightly irregular cannot rule out PAF- max 3 seconds in duration. \par \par Ref: Dr. De.

## 2021-10-21 NOTE — ADDENDUM
[FreeTextEntry1] : I was present for the above evaluation and agree with the history, physical exam, assessment and plan as above.  brief episodes of tachycardia c/w pAF, but all very brief thus far. as above if more sustained episodes would warrant anticoagulation. given low BP, will defer medical therapy for now unless episodes progress.

## 2021-10-21 NOTE — DISCUSSION/SUMMARY
[FreeTextEntry1] : Ms. Rodriguez is a 71 y/f with history of thyroid CA, with recurrent episodes of palpitations, lightheadedness, and syncope. An event monitor 12/2020 revealed brief episodes of SVT, and possible brief PAF, which did not correlate with symptoms. She is now s/p ILR implant for ongoing arrhythmia monitoring to correlate with symptoms, evaluate for arrhythmic etiology of syncope, and guide further management on 4/5/21. \par \par Remote followed by Dr. De. ILR has revealed many symptom recordings of lightheadedness have correlated with SR with rare APCs.  On two occasions there have been brief bursts of probable PAT one slightly irregular cannot rule out PAF- max 3 seconds in duration. \par \par Recommendation: \par \par -Ms. Rodriguez is doing well with most symptoms of lightheadedness correlating with SR rare APCs. On two occaions there have been brief bursts of probable SVT one slightly irregular cannot rule out PAF, up to 3 seconds in duration. Due to brief nature of episodes to continue monitoring. If more sustained PAF noted, to consider AC initiation. If more frequent or sustained episodes of SVT could consider beta blocker initiation, will defer at this time due to low normal BP. \par -Continue neurology w/u for lightheadedness and cardiology f/u with Dr. De. \par -EP follow up PRN.\par \par Danielle Soliz ANP-C

## 2021-11-03 ENCOUNTER — APPOINTMENT (OUTPATIENT)
Dept: CARDIOLOGY | Facility: CLINIC | Age: 71
End: 2021-11-03
Payer: MEDICARE

## 2021-11-03 PROCEDURE — G2066: CPT

## 2021-11-03 PROCEDURE — 93298 REM INTERROG DEV EVAL SCRMS: CPT | Mod: NC

## 2021-12-08 ENCOUNTER — APPOINTMENT (OUTPATIENT)
Dept: CARDIOLOGY | Facility: CLINIC | Age: 71
End: 2021-12-08
Payer: MEDICARE

## 2021-12-08 PROCEDURE — 93298 REM INTERROG DEV EVAL SCRMS: CPT

## 2021-12-08 PROCEDURE — G2066: CPT

## 2022-01-12 ENCOUNTER — APPOINTMENT (OUTPATIENT)
Dept: CARDIOLOGY | Facility: CLINIC | Age: 72
End: 2022-01-12
Payer: MEDICARE

## 2022-01-12 PROCEDURE — 93298 REM INTERROG DEV EVAL SCRMS: CPT

## 2022-01-12 PROCEDURE — G2066: CPT

## 2022-02-16 ENCOUNTER — APPOINTMENT (OUTPATIENT)
Dept: CARDIOLOGY | Facility: CLINIC | Age: 72
End: 2022-02-16
Payer: MEDICARE

## 2022-02-16 PROCEDURE — G2066: CPT

## 2022-02-16 PROCEDURE — 93298 REM INTERROG DEV EVAL SCRMS: CPT

## 2022-03-23 ENCOUNTER — APPOINTMENT (OUTPATIENT)
Dept: CARDIOLOGY | Facility: CLINIC | Age: 72
End: 2022-03-23
Payer: MEDICARE

## 2022-03-23 PROCEDURE — G2066: CPT

## 2022-03-23 PROCEDURE — 93298 REM INTERROG DEV EVAL SCRMS: CPT

## 2022-03-31 ENCOUNTER — RESULT CHARGE (OUTPATIENT)
Age: 72
End: 2022-03-31

## 2022-04-01 ENCOUNTER — APPOINTMENT (OUTPATIENT)
Dept: CARDIOLOGY | Facility: CLINIC | Age: 72
End: 2022-04-01
Payer: MEDICARE

## 2022-04-01 VITALS
SYSTOLIC BLOOD PRESSURE: 140 MMHG | BODY MASS INDEX: 39.93 KG/M2 | RESPIRATION RATE: 16 BRPM | DIASTOLIC BLOOD PRESSURE: 42 MMHG | HEIGHT: 62 IN | HEART RATE: 63 BPM | WEIGHT: 217 LBS

## 2022-04-01 VITALS — SYSTOLIC BLOOD PRESSURE: 126 MMHG | DIASTOLIC BLOOD PRESSURE: 70 MMHG

## 2022-04-01 PROCEDURE — 99213 OFFICE O/P EST LOW 20 MIN: CPT

## 2022-04-01 PROCEDURE — 93000 ELECTROCARDIOGRAM COMPLETE: CPT

## 2022-04-06 NOTE — ASSESSMENT
[FreeTextEntry1] : 1.  EKG today reveals sinus rhythm at 63 bpm.  APCs.  Poor R-wave progression leads V1 through V4.  In no acute ischemic changes.  \par \par 2.  Palpitations:  Patient with sporadic short bursts of palpitations.  Loop recorder to date has shown very little.  There has been on short episode of SVT.  No atrial fibrillation detected.  Patient currently uncomfortable with the position of the loop recorder and will discuss the possibility of extraction when she sees EP in two months.  \par \par 3.  Chronic obesity:  Patient advised on a strict carbohydrate and walking program in order to gradually lose weight.  If clinically stable, office visit four months.  \par

## 2022-04-06 NOTE — HISTORY OF PRESENT ILLNESS
[FreeTextEntry1] : From a cardiac standpoint, Mrs. Rodriguez offers few complaints at this time other than an occasional bout of palpitations.  There has been no lightheadedness or syncope.  Patient states that her loop recorder is migrating downward and beginning to bother her.  She will discuss early removal in June when she sees electrophysiology.  \par \par

## 2022-04-06 NOTE — REASON FOR VISIT
[FreeTextEntry1] : Mrs. Rodriguez is a pleasant 71-year-old obese white female with a past medical history significant for thyroid cancer, GERD, palpitations associated with lightheadedness and syncope with known SVT and questionable paroxysms of atrial fibrillation status-post implantable loop recorder, who presents for follow up. \par

## 2022-04-27 ENCOUNTER — APPOINTMENT (OUTPATIENT)
Dept: CARDIOLOGY | Facility: CLINIC | Age: 72
End: 2022-04-27
Payer: MEDICARE

## 2022-04-27 PROCEDURE — 93298 REM INTERROG DEV EVAL SCRMS: CPT

## 2022-04-27 PROCEDURE — G2066: CPT

## 2022-06-01 ENCOUNTER — APPOINTMENT (OUTPATIENT)
Dept: CARDIOLOGY | Facility: CLINIC | Age: 72
End: 2022-06-01
Payer: MEDICARE

## 2022-06-01 PROCEDURE — G2066: CPT

## 2022-06-01 PROCEDURE — 93298 REM INTERROG DEV EVAL SCRMS: CPT

## 2022-06-16 ENCOUNTER — APPOINTMENT (OUTPATIENT)
Dept: ELECTROPHYSIOLOGY | Facility: CLINIC | Age: 72
End: 2022-06-16
Payer: MEDICARE

## 2022-06-16 ENCOUNTER — NON-APPOINTMENT (OUTPATIENT)
Age: 72
End: 2022-06-16

## 2022-06-16 VITALS
OXYGEN SATURATION: 97 % | SYSTOLIC BLOOD PRESSURE: 98 MMHG | TEMPERATURE: 97.7 F | HEIGHT: 62 IN | WEIGHT: 220 LBS | HEART RATE: 71 BPM | BODY MASS INDEX: 40.48 KG/M2 | DIASTOLIC BLOOD PRESSURE: 62 MMHG

## 2022-06-16 PROCEDURE — 93000 ELECTROCARDIOGRAM COMPLETE: CPT

## 2022-06-16 PROCEDURE — 99214 OFFICE O/P EST MOD 30 MIN: CPT

## 2022-06-16 NOTE — HISTORY OF PRESENT ILLNESS
[de-identified] : Ms. Rodriguez is a 71 y/f with history of thyroid CA, with recurrent episodes of palpitations, lightheadedness, and syncope. An event monitor 12/2020 revealed brief episodes of SVT, and possible brief PAF, which did not correlate with symptoms. She is now s/p ILR implant for ongoing arrhythmia monitoring to correlate with symptoms, evaluate for arrhythmic etiology of syncope, and guide further management on 4/5/21. \par \par Remote followed by Dr. Almodovar. ILR has revealed many symptom recordings of lightheadedness have correlated with SR with rare APCs. On two occasions there have been brief bursts of probable PAT one slightly irregular cannot rule out PAF- max 3 seconds in duration. \par \par During f/u with Dr. Almodovar she reported that her ILR was migrating downward and beginning to bother her. Presents today to discuss explant. \par \par Today, she reports she has overall felt well since last follow up. Brief palpitations which have not been highly bothersome. Intermittent lightheadedness has continued, resolves after lying down for a few minutes.  No correlating arrhythmias on ILR. Symptom recordings have correlated with SR with few APCs. She does express ILR explant due to mild tenderness. \par \par Ref: Dr. Almodovar.

## 2022-06-16 NOTE — PHYSICAL EXAM
pt was bitten by cat on Wed pt L hand is red and swollen. pt states its his house cat and never got shots. pt denies medical problems. pt denies cough fever or SOB pt VSS NAD [General Appearance - Well Nourished] : well nourished [General Appearance - Well Developed] : well developed [Heart Rate And Rhythm] : heart rate and rhythm were normal [Heart Sounds] : normal S1 and S2 [Murmurs] : no murmurs present [Arterial Pulses Normal] : the arterial pulses were normal [Edema] : no peripheral edema present [] : no respiratory distress

## 2022-06-16 NOTE — REVIEW OF SYSTEMS
[Palpitations] : palpitations [Headache] : no headache [Feeling Fatigued] : not feeling fatigued [SOB] : no shortness of breath [Dyspnea on exertion] : not dyspnea during exertion [Chest Discomfort] : no chest discomfort [Lower Ext Edema] : no extremity edema [Orthopnea] : no orthopnea [Syncope] : no syncope [Dizziness] : no dizziness [de-identified] : Lightheadedness

## 2022-06-16 NOTE — DISCUSSION/SUMMARY
[FreeTextEntry1] : Ms. Rodriguez is a 71 y/f with history of thyroid CA, with recurrent episodes of palpitations, lightheadedness, and syncope. An event monitor 12/2020 revealed brief episodes of SVT, and possible brief PAF, which did not correlate with symptoms. She is now s/p ILR implant for ongoing arrhythmia monitoring to correlate with symptoms, evaluate for arrhythmic etiology of syncope, and guide further management on 4/5/21. \par \par Remote followed by Dr. Almodovar. ILR has revealed many symptom recordings of lightheadedness have correlated with SR with rare APCs. On two occasions there have been brief bursts of probable PAT one slightly irregular cannot rule out PAF- max 3 seconds in duration. \par \par During f/u with Dr. Almodovar she reported that her ILR was migrating downward and beginning to bother her. Presents today to discuss explant. \par \par Today, she reports she has overall felt well since last follow up. Brief palpitations which have not been highly bothersome. Intermittent lightheadedness has continued, resolves after lying down for a few minutes.  No correlating arrhythmias on ILR. Symptom recordings have correlated with SR with few APCs. She does express ILR explant due to mild tenderness. \par \par Recommendation: \par \par ILR has revealed no sustained arrhythmias since implant >1 year ago. Symptoms of lightheadedness have not correlated with arrhythmia. Brief bursts of SVT noted with brief possible AF < 3 seconds in duration, no sustained arrhythmias. C/O mild intermittent tenderness to ILR site. We discussed benefits of ILR monitoring, she wishes to defer explant for now. Will report if symptoms progress and she wishes to have explant.\par \par Danielle ALY-C

## 2022-07-06 ENCOUNTER — APPOINTMENT (OUTPATIENT)
Dept: CARDIOLOGY | Facility: CLINIC | Age: 72
End: 2022-07-06

## 2022-07-06 VITALS
RESPIRATION RATE: 16 BRPM | DIASTOLIC BLOOD PRESSURE: 68 MMHG | HEART RATE: 66 BPM | WEIGHT: 208 LBS | HEIGHT: 63 IN | SYSTOLIC BLOOD PRESSURE: 100 MMHG | BODY MASS INDEX: 36.86 KG/M2

## 2022-07-06 PROCEDURE — 99214 OFFICE O/P EST MOD 30 MIN: CPT

## 2022-07-06 PROCEDURE — 93298 REM INTERROG DEV EVAL SCRMS: CPT

## 2022-07-06 PROCEDURE — 93000 ELECTROCARDIOGRAM COMPLETE: CPT

## 2022-07-06 PROCEDURE — G2066: CPT

## 2022-07-06 RX ORDER — LEVOTHYROXINE SODIUM 0.12 MG/1
125 TABLET ORAL DAILY
Qty: 90 | Refills: 0 | Status: DISCONTINUED | COMMUNITY
End: 2022-07-06

## 2022-07-07 NOTE — ASSESSMENT
[FreeTextEntry1] : 1.  EKG today reveals normal sinus rhythm with sinus arrhythmia at 66 bpm.  Poor R-wave progression across precordial leads.  Normal intervals.  No evidence of ischemia. \par \par 2.  Palpitations/lightheadedness:  Patient with multiple bouts of symptoms and little by way of correlation with an implanted loop recorder.  She will continue to wear this until she returns from her family vacation in [place] at which point, she is likely to have it extracted.    \par

## 2022-07-07 NOTE — HISTORY OF PRESENT ILLNESS
[FreeTextEntry1] : From a cardiac standpoint, Mrs. Rodriguez denies exertional chest pain or shortness of breath.  She does admit to occasional bouts of lightheadedness.  To date, her loop recorder has failed to reveal significant arrhythmias to correlate with those complaints.  She also states that her loop recorder is beginning to “annoy her,” and she is contemplating removal.\par \par

## 2022-08-10 ENCOUNTER — APPOINTMENT (OUTPATIENT)
Dept: CARDIOLOGY | Facility: CLINIC | Age: 72
End: 2022-08-10

## 2022-08-10 PROCEDURE — 93298 REM INTERROG DEV EVAL SCRMS: CPT

## 2022-08-10 PROCEDURE — G2066: CPT

## 2022-08-22 ENCOUNTER — APPOINTMENT (OUTPATIENT)
Dept: CARDIOLOGY | Facility: CLINIC | Age: 72
End: 2022-08-22

## 2022-09-15 ENCOUNTER — APPOINTMENT (OUTPATIENT)
Dept: CARDIOLOGY | Facility: CLINIC | Age: 72
End: 2022-09-15

## 2022-09-15 PROCEDURE — 93298 REM INTERROG DEV EVAL SCRMS: CPT

## 2022-09-15 PROCEDURE — G2066: CPT

## 2022-09-29 ENCOUNTER — NON-APPOINTMENT (OUTPATIENT)
Age: 72
End: 2022-09-29

## 2022-09-29 ENCOUNTER — APPOINTMENT (OUTPATIENT)
Dept: ELECTROPHYSIOLOGY | Facility: CLINIC | Age: 72
End: 2022-09-29

## 2022-09-29 VITALS
HEIGHT: 63 IN | TEMPERATURE: 98 F | WEIGHT: 208 LBS | SYSTOLIC BLOOD PRESSURE: 104 MMHG | OXYGEN SATURATION: 99 % | BODY MASS INDEX: 36.86 KG/M2 | HEART RATE: 62 BPM | DIASTOLIC BLOOD PRESSURE: 70 MMHG

## 2022-09-29 PROCEDURE — 93000 ELECTROCARDIOGRAM COMPLETE: CPT

## 2022-09-29 PROCEDURE — 99214 OFFICE O/P EST MOD 30 MIN: CPT

## 2022-09-29 NOTE — PHYSICAL EXAM
[General Appearance - Well Developed] : well developed [Normal Appearance] : normal appearance [Heart Rate And Rhythm] : heart rate and rhythm were normal [Heart Sounds] : normal S1 and S2 [Murmurs] : no murmurs present [] : no respiratory distress

## 2022-09-29 NOTE — DISCUSSION/SUMMARY
[FreeTextEntry1] :  Ms. Rodriguez is a 71 y/f with history of thyroid CA, with recurrent episodes of palpitations, lightheadedness, and syncope. An event monitor 12/2020 revealed brief episodes of SVT, and possible brief PAF, which did not correlate with symptoms. She is now s/p ILR implant for ongoing arrhythmia monitoring to correlate with symptoms, evaluate for arrhythmic etiology of syncope, and guide further management on 4/5/21. \par \par Remote followed by Dr. De. ILR has revealed many symptom recordings of lightheadedness have correlated with SR with rare APCs. On two occasions there have been brief bursts of probable PAT one slightly irregular cannot rule out PAF- max 3 seconds in duration. \par \par During f/u with Dr. De she reported that her ILR was migrating downward and beginning to bother her. During previous f/u she reported mild tenderness to ILR site and due to benefits of ongoing monitoring she wished to defer explant. \par \par Today, she reports she overall has been doing well since last follow up. Still experiences intermittent lightheadedness which has not correlated with arrhythmia. Has expressed desire in past to consider ILR explant however today she reports she wishes to continue monitoring. \par \par Recommendation: \par \par -ILR has revealed no sustained arrhythmias since implant >1 year ago. Symptoms of lightheadedness have not correlated with arrhythmia. Brief bursts of SVT noted with brief possible AF < 3 seconds in duration in past, no sustained arrhythmias. C/O mild intermittent tenderness to ILR site. We discussed benefits of ILR monitoring including surveillance for sustained atrial fibrillation, she wishes to defer explant for now. Will report if symptoms progress and she wishes to have explant.\par -EP follow up in 6 months or PRN.\par \par Danielle Soliz ANP-C. \par \par  \par

## 2022-09-29 NOTE — REVIEW OF SYSTEMS
[Headache] : no headache [Feeling Fatigued] : not feeling fatigued [SOB] : no shortness of breath [Dyspnea on exertion] : not dyspnea during exertion [Chest Discomfort] : no chest discomfort [Palpitations] : no palpitations [Orthopnea] : no orthopnea [Syncope] : no syncope [de-identified] : lightheadedness

## 2022-09-29 NOTE — HISTORY OF PRESENT ILLNESS
[de-identified] :  Ms. Rodriguez is a 71 y/f with history of thyroid CA, with recurrent episodes of palpitations, lightheadedness, and syncope. An event monitor 12/2020 revealed brief episodes of SVT, and possible brief PAF, which did not correlate with symptoms. She is now s/p ILR implant for ongoing arrhythmia monitoring to correlate with symptoms, evaluate for arrhythmic etiology of syncope, and guide further management on 4/5/21. \par \par Remote followed by Dr. De. ILR has revealed many symptom recordings of lightheadedness have correlated with SR with rare APCs. On two occasions there have been brief bursts of probable PAT one slightly irregular cannot rule out PAF- max 3 seconds in duration. \par \par During f/u with Dr. De she reported that her ILR was migrating downward and beginning to bother her. During previous f/u she reported mild tenderness to ILR site and due to benefits of ongoing monitoring she wished to defer explant. \par \par Today, she reports she overall has been doing well since last follow up. Still experiences intermittent lightheadedness which has not correlated with arrhythmia. Has expressed desire in past to consider ILR explant however today she reports she wishes to continue monitoring.

## 2022-10-19 ENCOUNTER — APPOINTMENT (OUTPATIENT)
Dept: CARDIOLOGY | Facility: CLINIC | Age: 72
End: 2022-10-19

## 2022-10-19 PROCEDURE — 93298 REM INTERROG DEV EVAL SCRMS: CPT

## 2022-10-19 PROCEDURE — G2066: CPT

## 2022-11-01 ENCOUNTER — APPOINTMENT (OUTPATIENT)
Dept: CARDIOLOGY | Facility: CLINIC | Age: 72
End: 2022-11-01

## 2022-11-01 VITALS
DIASTOLIC BLOOD PRESSURE: 82 MMHG | RESPIRATION RATE: 16 BRPM | WEIGHT: 210 LBS | HEART RATE: 66 BPM | HEIGHT: 63 IN | SYSTOLIC BLOOD PRESSURE: 112 MMHG | BODY MASS INDEX: 37.21 KG/M2

## 2022-11-01 PROCEDURE — 93000 ELECTROCARDIOGRAM COMPLETE: CPT

## 2022-11-01 PROCEDURE — 99214 OFFICE O/P EST MOD 30 MIN: CPT

## 2022-11-03 NOTE — REASON FOR VISIT
[FreeTextEntry1] : Mrs. Rodriguez is a pleasant 72-year-old obese white female with a past medical history significant for palpitations associated with lightheadedness as well as syncope whom is known to have SVT and possible paroxysms of atrial fibrillation for which she is status-post implantable loop recorder.  Patient also has a history of thyroid cancer and GERD, and presents for follow up evaluation.  \par

## 2022-11-03 NOTE — HISTORY OF PRESENT ILLNESS
[FreeTextEntry1] : From a cardiac standpoint, Mrs. Rodriguez feels well denying exertional chest pain or shortness of breath, or palpitations.  She remains reasonably active. \par \par \par

## 2022-11-28 ENCOUNTER — APPOINTMENT (OUTPATIENT)
Dept: CARDIOLOGY | Facility: CLINIC | Age: 72
End: 2022-11-28

## 2022-11-28 PROCEDURE — 93298 REM INTERROG DEV EVAL SCRMS: CPT

## 2022-11-28 PROCEDURE — G2066: CPT

## 2022-12-28 ENCOUNTER — APPOINTMENT (OUTPATIENT)
Dept: CARDIOLOGY | Facility: CLINIC | Age: 72
End: 2022-12-28
Payer: MEDICARE

## 2022-12-28 PROCEDURE — 93298 REM INTERROG DEV EVAL SCRMS: CPT

## 2022-12-28 PROCEDURE — G2066: CPT

## 2023-01-30 ENCOUNTER — NON-APPOINTMENT (OUTPATIENT)
Age: 73
End: 2023-01-30

## 2023-01-30 ENCOUNTER — EMERGENCY (EMERGENCY)
Facility: HOSPITAL | Age: 73
LOS: 1 days | Discharge: HOME | End: 2023-01-30
Attending: EMERGENCY MEDICINE
Payer: MEDICARE

## 2023-01-30 ENCOUNTER — APPOINTMENT (OUTPATIENT)
Dept: CARDIOLOGY | Facility: CLINIC | Age: 73
End: 2023-01-30
Payer: MEDICARE

## 2023-01-30 VITALS
HEIGHT: 63 IN | WEIGHT: 203 LBS | RESPIRATION RATE: 16 BRPM | DIASTOLIC BLOOD PRESSURE: 58 MMHG | SYSTOLIC BLOOD PRESSURE: 92 MMHG | BODY MASS INDEX: 35.97 KG/M2 | HEART RATE: 89 BPM

## 2023-01-30 VITALS
OXYGEN SATURATION: 95 % | RESPIRATION RATE: 19 BRPM | HEART RATE: 69 BPM | SYSTOLIC BLOOD PRESSURE: 117 MMHG | WEIGHT: 169.98 LBS | DIASTOLIC BLOOD PRESSURE: 71 MMHG | TEMPERATURE: 98 F

## 2023-01-30 VITALS
OXYGEN SATURATION: 96 % | SYSTOLIC BLOOD PRESSURE: 131 MMHG | DIASTOLIC BLOOD PRESSURE: 87 MMHG | HEART RATE: 89 BPM | RESPIRATION RATE: 18 BRPM

## 2023-01-30 LAB
ALBUMIN SERPL ELPH-MCNC: 4.1 G/DL — SIGNIFICANT CHANGE UP (ref 3.3–5.2)
ALP SERPL-CCNC: 101 U/L — SIGNIFICANT CHANGE UP (ref 40–120)
ALT FLD-CCNC: 9 U/L — SIGNIFICANT CHANGE UP
ANION GAP SERPL CALC-SCNC: 11 MMOL/L — SIGNIFICANT CHANGE UP (ref 5–17)
APPEARANCE UR: CLEAR — SIGNIFICANT CHANGE UP
APTT BLD: 28.7 SEC — SIGNIFICANT CHANGE UP (ref 27.5–35.5)
AST SERPL-CCNC: 14 U/L — SIGNIFICANT CHANGE UP
BACTERIA # UR AUTO: ABNORMAL
BASOPHILS # BLD AUTO: 0.04 K/UL — SIGNIFICANT CHANGE UP (ref 0–0.2)
BASOPHILS NFR BLD AUTO: 0.5 % — SIGNIFICANT CHANGE UP (ref 0–2)
BILIRUB SERPL-MCNC: 0.8 MG/DL — SIGNIFICANT CHANGE UP (ref 0.4–2)
BILIRUB UR-MCNC: NEGATIVE — SIGNIFICANT CHANGE UP
BUN SERPL-MCNC: 18.8 MG/DL — SIGNIFICANT CHANGE UP (ref 8–20)
CALCIUM SERPL-MCNC: 9.5 MG/DL — SIGNIFICANT CHANGE UP (ref 8.4–10.5)
CHLORIDE SERPL-SCNC: 102 MMOL/L — SIGNIFICANT CHANGE UP (ref 96–108)
CO2 SERPL-SCNC: 26 MMOL/L — SIGNIFICANT CHANGE UP (ref 22–29)
COLOR SPEC: YELLOW — SIGNIFICANT CHANGE UP
CREAT SERPL-MCNC: 1.38 MG/DL — HIGH (ref 0.5–1.3)
DIFF PNL FLD: ABNORMAL
EGFR: 40 ML/MIN/1.73M2 — LOW
EOSINOPHIL # BLD AUTO: 0.09 K/UL — SIGNIFICANT CHANGE UP (ref 0–0.5)
EOSINOPHIL NFR BLD AUTO: 1.1 % — SIGNIFICANT CHANGE UP (ref 0–6)
EPI CELLS # UR: ABNORMAL
FLUAV AG NPH QL: SIGNIFICANT CHANGE UP
FLUBV AG NPH QL: SIGNIFICANT CHANGE UP
GLUCOSE SERPL-MCNC: 111 MG/DL — HIGH (ref 70–99)
GLUCOSE UR QL: NEGATIVE MG/DL — SIGNIFICANT CHANGE UP
HCT VFR BLD CALC: 40.9 % — SIGNIFICANT CHANGE UP (ref 34.5–45)
HGB BLD-MCNC: 12.9 G/DL — SIGNIFICANT CHANGE UP (ref 11.5–15.5)
IMM GRANULOCYTES NFR BLD AUTO: 0.2 % — SIGNIFICANT CHANGE UP (ref 0–0.9)
INR BLD: 1.1 RATIO — SIGNIFICANT CHANGE UP (ref 0.88–1.16)
KETONES UR-MCNC: ABNORMAL
LEUKOCYTE ESTERASE UR-ACNC: ABNORMAL
LYMPHOCYTES # BLD AUTO: 1.14 K/UL — SIGNIFICANT CHANGE UP (ref 1–3.3)
LYMPHOCYTES # BLD AUTO: 13.9 % — SIGNIFICANT CHANGE UP (ref 13–44)
MAGNESIUM SERPL-MCNC: 2.1 MG/DL — SIGNIFICANT CHANGE UP (ref 1.6–2.6)
MCHC RBC-ENTMCNC: 27.9 PG — SIGNIFICANT CHANGE UP (ref 27–34)
MCHC RBC-ENTMCNC: 31.5 GM/DL — LOW (ref 32–36)
MCV RBC AUTO: 88.3 FL — SIGNIFICANT CHANGE UP (ref 80–100)
MONOCYTES # BLD AUTO: 0.63 K/UL — SIGNIFICANT CHANGE UP (ref 0–0.9)
MONOCYTES NFR BLD AUTO: 7.7 % — SIGNIFICANT CHANGE UP (ref 2–14)
NEUTROPHILS # BLD AUTO: 6.31 K/UL — SIGNIFICANT CHANGE UP (ref 1.8–7.4)
NEUTROPHILS NFR BLD AUTO: 76.6 % — SIGNIFICANT CHANGE UP (ref 43–77)
NITRITE UR-MCNC: NEGATIVE — SIGNIFICANT CHANGE UP
PH UR: 6 — SIGNIFICANT CHANGE UP (ref 5–8)
PLATELET # BLD AUTO: 199 K/UL — SIGNIFICANT CHANGE UP (ref 150–400)
POTASSIUM SERPL-MCNC: 4.1 MMOL/L — SIGNIFICANT CHANGE UP (ref 3.5–5.3)
POTASSIUM SERPL-SCNC: 4.1 MMOL/L — SIGNIFICANT CHANGE UP (ref 3.5–5.3)
PROT SERPL-MCNC: 7.1 G/DL — SIGNIFICANT CHANGE UP (ref 6.6–8.7)
PROT UR-MCNC: 30 MG/DL
PROTHROM AB SERPL-ACNC: 12.8 SEC — SIGNIFICANT CHANGE UP (ref 10.5–13.4)
RBC # BLD: 4.63 M/UL — SIGNIFICANT CHANGE UP (ref 3.8–5.2)
RBC # FLD: 14.6 % — HIGH (ref 10.3–14.5)
RBC CASTS # UR COMP ASSIST: SIGNIFICANT CHANGE UP /HPF (ref 0–4)
RSV RNA NPH QL NAA+NON-PROBE: SIGNIFICANT CHANGE UP
SARS-COV-2 RNA SPEC QL NAA+PROBE: SIGNIFICANT CHANGE UP
SODIUM SERPL-SCNC: 139 MMOL/L — SIGNIFICANT CHANGE UP (ref 135–145)
SP GR SPEC: 1.02 — SIGNIFICANT CHANGE UP (ref 1.01–1.02)
TROPONIN T SERPL-MCNC: <0.01 NG/ML — SIGNIFICANT CHANGE UP (ref 0–0.06)
TROPONIN T SERPL-MCNC: <0.01 NG/ML — SIGNIFICANT CHANGE UP (ref 0–0.06)
UROBILINOGEN FLD QL: 1 MG/DL
WBC # BLD: 8.23 K/UL — SIGNIFICANT CHANGE UP (ref 3.8–10.5)
WBC # FLD AUTO: 8.23 K/UL — SIGNIFICANT CHANGE UP (ref 3.8–10.5)
WBC UR QL: SIGNIFICANT CHANGE UP /HPF (ref 0–5)

## 2023-01-30 PROCEDURE — 71045 X-RAY EXAM CHEST 1 VIEW: CPT | Mod: 26

## 2023-01-30 PROCEDURE — 99214 OFFICE O/P EST MOD 30 MIN: CPT

## 2023-01-30 PROCEDURE — 85610 PROTHROMBIN TIME: CPT

## 2023-01-30 PROCEDURE — 87086 URINE CULTURE/COLONY COUNT: CPT

## 2023-01-30 PROCEDURE — 96360 HYDRATION IV INFUSION INIT: CPT

## 2023-01-30 PROCEDURE — 85730 THROMBOPLASTIN TIME PARTIAL: CPT

## 2023-01-30 PROCEDURE — 71045 X-RAY EXAM CHEST 1 VIEW: CPT

## 2023-01-30 PROCEDURE — 85025 COMPLETE CBC W/AUTO DIFF WBC: CPT

## 2023-01-30 PROCEDURE — 93010 ELECTROCARDIOGRAM REPORT: CPT

## 2023-01-30 PROCEDURE — 70450 CT HEAD/BRAIN W/O DYE: CPT | Mod: MB

## 2023-01-30 PROCEDURE — 87637 SARSCOV2&INF A&B&RSV AMP PRB: CPT

## 2023-01-30 PROCEDURE — 72125 CT NECK SPINE W/O DYE: CPT | Mod: MB

## 2023-01-30 PROCEDURE — 36415 COLL VENOUS BLD VENIPUNCTURE: CPT

## 2023-01-30 PROCEDURE — 84484 ASSAY OF TROPONIN QUANT: CPT

## 2023-01-30 PROCEDURE — 83735 ASSAY OF MAGNESIUM: CPT

## 2023-01-30 PROCEDURE — 80053 COMPREHEN METABOLIC PANEL: CPT

## 2023-01-30 PROCEDURE — 99285 EMERGENCY DEPT VISIT HI MDM: CPT

## 2023-01-30 PROCEDURE — 70450 CT HEAD/BRAIN W/O DYE: CPT | Mod: 26,MB

## 2023-01-30 PROCEDURE — 93005 ELECTROCARDIOGRAM TRACING: CPT

## 2023-01-30 PROCEDURE — 93000 ELECTROCARDIOGRAM COMPLETE: CPT

## 2023-01-30 PROCEDURE — 81001 URINALYSIS AUTO W/SCOPE: CPT

## 2023-01-30 PROCEDURE — 72125 CT NECK SPINE W/O DYE: CPT | Mod: 26,MB

## 2023-01-30 PROCEDURE — 99285 EMERGENCY DEPT VISIT HI MDM: CPT | Mod: 25

## 2023-01-30 RX ORDER — SODIUM CHLORIDE 9 MG/ML
1000 INJECTION INTRAMUSCULAR; INTRAVENOUS; SUBCUTANEOUS ONCE
Refills: 0 | Status: COMPLETED | OUTPATIENT
Start: 2023-01-30 | End: 2023-01-30

## 2023-01-30 RX ADMIN — SODIUM CHLORIDE 1000 MILLILITER(S): 9 INJECTION INTRAMUSCULAR; INTRAVENOUS; SUBCUTANEOUS at 17:27

## 2023-01-30 NOTE — ED ADULT TRIAGE NOTE - CHIEF COMPLAINT QUOTE
Pt brought in by ems for a syncopal episode after 1 episode of vomiting per ems pt was slumped over his walker upon there arrival  hx of syncopal episodes
Pt undressed and placed in a gown
Pt BIBA from cardiology office for 3 near syncopal episodes over the past 2 days.  Pt c/o lightheadedness on arrival.  Pt has loop recorder in place and as per EMS, no events recorded over the past 2 days.  PMH SVT

## 2023-01-30 NOTE — ED ADULT NURSE NOTE - OBJECTIVE STATEMENT
Pt c/o syncopal episodes for 1 x wk w/ dizziness seen at pcp today told to come in for further eval hx dementia in no acute distress pending md yordan rg rn

## 2023-01-30 NOTE — ED PROVIDER NOTE - CLINICAL SUMMARY MEDICAL DECISION MAKING FREE TEXT BOX
: 71 yo F hx thryoidectomy and early dementia send in from Dr.San Carlos's office for multiple episode of episode of near syncope and syncope. At cardiologist's office patient had loop recorder that showed no acute event. Bp was low in office. patient report decreased appetite. no chest pain or sob. EKG showed diffuse T wave flattening. As interpreted by undersigned physician, chest xray demonstrates no acute pathology. Lab values do not require emergent intervention. trop negative. viral panel negative. IVF given. pending CT head and cardiology consult. : 71 yo F hx thryoidectomy and early dementia send in from Dr.San Carlos's office for multiple episode of episode of near syncope and syncope. At cardiologist's office patient had loop recorder that showed no acute event. Bp was low in office. patient report decreased appetite. no chest pain or sob. EKG showed diffuse T wave flattening. As interpreted by undersigned physician, chest xray demonstrates no acute pathology. Lab values do not require emergent intervention. trop negative. viral panel negative. IVF given. CT head and cervical spine negative for acute pathology. I spoke with cardiology, stable for outpatient follow up. vital signs normal.

## 2023-01-30 NOTE — ED PROVIDER NOTE - OBJECTIVE STATEMENT
72y Female PMHx thyroidectomy, early onset dementia, presents to the ED complaining of syncopal episodes. Last week on Thursday the pt had her first near syncopal episode while out at a restaurant 2hrs after having a martini. She explains that she started to feel lightheaded and then her  sat her back down and gave her water which helped alleviate her sx. Yesterday the pt was walking back to bed from the bathroom at 10pm and she felt like everything went black and then fell to the ground while her  was holding onto her. Her  explained that during both episodes she felt very clammy. The pt then had another syncopal episode 2 hours later when she was getting up from bed to go to the bathroom. During these episodes the pt has some confusion as to what happened and wakes up on the floor. The pt and  deny any head injury during these episodes. The pt denies any chest pain or spinning of the room preceding these episodes and the  denies any convulsions during these episodes. Pt does admit to a generalized weakness when she stands up. Today the pt went to her cardiologist and they were unable to get a BP on her, which prompted them to send her here where her cardiologist is affiliated with. Pt also explains that she was recently in the past few months started on two different medications for her dementia (Donepezil?), but stopped both after these sx of syncope came on. Denies any recent travel, sick contacts, fever, chest pain, SOB, nausea, vomiting, abdominal pain, diarrhea, dysuria, hematuria, hematemesis, hematochezia, calf pain or swelling.

## 2023-01-30 NOTE — ED ADULT TRIAGE NOTE - MODE OF ARRIVAL
CHIEF COMPLAINT / HISTORY OF PRESENT ILLNESS:      Chief Complaint   Patient presents with   • UTI   • Back Pain   • Sinus Problem       Jenniffer Rene is a 65 year old female, relates sinus infection about 3 weeks ago. Has been having thick green nasal discharge- however this is starting to improve. Has mild headache. She relates her son's fiance had Covid and her son now having covid symptoms. Patient last saw her son on Thursday 12/17 for approx 30 minutes and they were not wearing masks. Has had some lightheadedness for past week. She has since developed some diarrhea. She feels fatigued.    Also having urinary frequency- going every 10 minutes and is malodorous. Also has right sided back pain, no dysuria or blood in urine.     Feeling chilled at times, alternating with feeling hot- but has not taken her temperature. Her temperature today was 96 at check in.     Past Medical History:   Diagnosis Date   • Arthritis     hands and knees   • Atrial fibrillation (CMS/HCC)    • Colon Polyp    • Depression    • History of CVA (cerebrovascular accident) 04/2018    L MCA cardioembolic s/p mechanical thrombectomy    • Hyperplastic colon polyp    • Low blood pressure    • Pulmonary emboli (CMS/HCC)     s/p knee surgery 10 years ago   • Tubular adenoma of colon        Current Outpatient Medications   Medication Sig Dispense Refill   • metoPROLOL succinate (TOPROL-XL) 50 MG 24 hr tablet Take 1 tablet by mouth daily. 90 tablet 3   • rivaroxaban (Xarelto) 20 MG Tab      • tretinoin (RETIN-A) 0.05 % cream      • AMIODarone (PACERONE) 200 MG tablet Alternate 100 mg daily with 200 mg daily 45 tablet 3   • atorvastatin (LIPITOR) 20 MG tablet TAKE ONE TABLET BY MOUTH AT BEDTIME 90 tablet 3   • venlafaxine XR (EFFEXOR XR) 75 MG 24 hr capsule Take 2 capsules by mouth daily. 2 capsules =150mg (Patient taking differently: Take 75 mg by mouth daily. )     • acetaminophen (TYLENOL) 325 MG tablet Take 650 mg by mouth every 4 hours as  needed for Pain.     • amoxicillin (AMOXIL) 500 MG tablet Take 1 tablet by mouth 3 times daily for 7 days. Indications: UTI and sinusitis 21 tablet 0     No current facility-administered medications for this visit.        ALLERGIES:  No Known Allergies    Review of Systems   Constitutional: Positive for chills and fatigue.   HENT: Positive for congestion, rhinorrhea, sinus pain and sore throat. Negative for ear discharge and mouth sores.    Respiratory: Positive for cough (related to sinuses). Negative for shortness of breath.    Cardiovascular: Negative.    Gastrointestinal: Positive for diarrhea (today). Negative for abdominal pain, blood in stool, nausea and vomiting.   Genitourinary: Positive for frequency. Negative for dysuria, hematuria and urgency.   Musculoskeletal: Negative.    Skin: Negative.    Neurological: Positive for dizziness and light-headedness. Negative for headaches.       PHYSICAL EXAM:  Visit Vitals  /78 (BP Location: LUE - Left upper extremity, Patient Position: Sitting, Cuff Size: Regular)   Pulse 65   Wt 68 kg   LMP  (LMP Unknown)   SpO2 91%   BMI 23.49 kg/m²     Physical Exam  Constitutional:       General: She is not in acute distress.  HENT:      Head: Normocephalic.      Right Ear: Tympanic membrane, ear canal and external ear normal.      Left Ear: Tympanic membrane, ear canal and external ear normal.      Nose: Congestion and rhinorrhea present.      Mouth/Throat:      Mouth: Mucous membranes are moist.      Pharynx: No oropharyngeal exudate or posterior oropharyngeal erythema.   Eyes:      General:         Right eye: No discharge.         Left eye: No discharge.      Extraocular Movements: Extraocular movements intact.      Conjunctiva/sclera: Conjunctivae normal.      Pupils: Pupils are equal, round, and reactive to light.   Cardiovascular:      Rate and Rhythm: Normal rate and regular rhythm.   Pulmonary:      Effort: Pulmonary effort is normal. No respiratory distress.       Breath sounds: No wheezing or rales.   Abdominal:      General: Bowel sounds are normal. There is no distension.      Palpations: Abdomen is soft.      Tenderness: There is no abdominal tenderness. There is right CVA tenderness. There is no left CVA tenderness or guarding.   Musculoskeletal:      Right lower leg: No edema.      Left lower leg: No edema.      Comments: No spinal tenderness   Neurological:      Mental Status: She is alert and oriented to person, place, and time. Mental status is at baseline.      Cranial Nerves: No cranial nerve deficit.      Gait: Gait normal.      Comments: Has some slowness in her response mild expressive aphasia- secondary old stroke   Psychiatric:         Mood and Affect: Mood normal.       CBC   Recent Labs   Lab 12/28/20  1548 09/10/20  1456 08/18/20  0520   WBC 8.5 8.6 7.8   HGB 13.1 12.3 11.7*   HCT 40.6 38.8 36.7    326 173   Neutrophil, Percent 72 63 75     CMP  Recent Labs   Lab 12/28/20  1548 09/10/20  1456 08/18/20  0520   Sodium 142 139 141   Potassium 4.0 4.3 4.4   Chloride 108* 106 104   Carbon Dioxide 32 29 28   BUN 17 36* 20   Creatinine 1.10* 1.06* 0.96*   Glucose 95 85 100*   GOT/AST  --  11  --    Bilirubin, Total  --  0.4  --    Albumin  --  3.9  --        Hemoglobin A1C (%)   Date Value   10/11/2019 5.4            TSH (mcUnits/mL)   Date Value   09/10/2020 2.341   10/12/2019 1.920     No results found for: VITD25    INR   Date Value   08/18/2020 1.1 sec   10/11/2019 1.3   05/25/2018 2.9   05/24/2018 2.6   05/23/2018 2.4     INR-POC (no units)   Date Value   06/07/2018 1.4   06/04/2018 4.1   06/01/2018 2.4   05/30/2018 3.7       INFECTIOUS DISEASE:  SARS-CoV-2 by PCR (no units)   Date Value   08/15/2020 Not Detected      No results found for: SRSCOR  Isolation Guidelines (no units)   Date Value   08/15/2020     Do not use this test result as the sole decision-maker for discontinuing isolation, de-escalate to NonCOVID-19, droplet and contact isolation per  the following guidelines:    If a patient has met criteria below and tested negative for COVID-19, COVID isolation precautions may be \"de-escalated\" to Droplet and Contact precautions.    The following criteria must be met before a patient can be removed from isolation.  At least 3 days (72 hours) since resolution of fever without the use of fever-reducing medications  AND  Significant improvement in respiratory symptoms (e.g., cough, shortness of breath)  AND  Negative COVID-19 Test    • This removes the obligation for N95 masks and eyewear protection.  • The rationale to continue Droplet and Contact precautions is to protect both patient and HCW from any other virus causing the patient's illness    Check COVID-19 Toolkit for most up to date information:  [email or web address]           IMAGING  No results found.      ASSESSMENT:      Dysuria  With right side back pain. UA suspicious for UTI with leukocyte esterase and bacteria.   Start amoxicillin- may need to switch therapy pending urine culture    Exposure to COVID-19 virus / Potential Covid 19 infection  Sons' fiancee positive for covid, son now symptomatic. Patient saw son last 12/17/2020- they do not live together. Symptoms diarrhea, fatigue, chills started 1 week ago. Sinus congestion started 3 weeks ago   [place] 3[place]  Discussed isolation precautions per cdc guidelines    Acute non-recurrent maxillary sinusitis  Will start antibiotic as has been symptomatic for 3 weeks      Orders Placed This Encounter   • amoxicillin (AMOXIL) 500 MG tablet     Sig: Take 1 tablet by mouth 3 times daily for 7 days. Indications: UTI and sinusitis     Dispense:  21 tablet     Refill:  0       Follow up if no improvement or symptoms worsen.     Ambulance EMS

## 2023-01-30 NOTE — CARDIOLOGY SUMMARY
[de-identified] : Sinus rhythm at 89 bpm.  Poor R-wave progression in V1-V3.  No acute ischemic changes.

## 2023-01-30 NOTE — ED PROVIDER NOTE - IV ALTEPLASE ADMIN OUTSIDE HIDDEN
This note was copied from a baby's chart.  Courtesy visit to check on breastfeeding. Mom and infant will be DC today. Mom states breastfeeding is going well. Mom is doing both breast and bottle feeding. Mom denies any questions or concerns.  Support given.  Encouraged to call with any needs.  Mom has lactation's office number to call, once home.     Time spent with pt: 10 min    BENJIE Jennings, RN, CLC, IBCLC      
show

## 2023-01-30 NOTE — ED PROVIDER NOTE - ATTENDING CONTRIBUTION TO CARE
I, Anshul Jimenez, have personally performed the HPI, physical exam and medical decision making and was personally present and verified all student documentation or findings including history, physical exam, and medical decision making except as noted.    See MDM

## 2023-01-30 NOTE — ED ADULT NURSE NOTE - CHIEF COMPLAINT QUOTE
Pt BIBA from cardiology office for 3 near syncopal episodes over the past 2 days.  Pt c/o lightheadedness on arrival.  Pt has loop recorder in place and as per EMS, no events recorded over the past 2 days.  PMH SVT

## 2023-01-30 NOTE — ED PROVIDER NOTE - PHYSICAL EXAMINATION
Gen: Well appearing in NAD  Head: NC/AT  EENT: PERRLA  Neck: trachea midline  Cardiac: RRR, S1 and S2 heard on auscultation, BP sitting 125/77, after standing 105/55   Resp:  No distress, breath sounds clear to ausculation b/l   Ext: no deformities, no swelling, no calf tenderness   Neuro:  A&O appears non focal  Skin:  Warm and dry as visualized  Psych:  Normal affect and mood

## 2023-01-30 NOTE — HISTORY OF PRESENT ILLNESS
[FreeTextEntry1] : Mrs. Rodriguez presents today for follow up evaluation of 3 recent syncopal episodes.  All episodes were witnessed by the patient's .  The first episode occurred last Thursday after dinner and having a Martini.  She had 2 episodes yesterday, at home on her way to and from the bathroom.  Patient states she felt lightheaded, weak and passed out.  Denies experiencing any chest pain, shortness of breath, palpitations.  Her  states that she was "very clammy" with every episode.

## 2023-01-30 NOTE — ED PROVIDER NOTE - PROGRESS NOTE DETAILS
: I spoke to  who report patient had work up with normal Echo. loop recorder showed no  arrythmia. syncope maybe be vasovagal and hypotension. If vital sign normal, stable for discharge.

## 2023-01-30 NOTE — ED ADULT NURSE REASSESSMENT NOTE - NS ED NURSE REASSESS COMMENT FT1
results and follow up were reviewed with patient and . VSS. IV removed. will f/u outpt. pt to car with wheelchair. walked to wheelchair with steady gait.

## 2023-01-30 NOTE — ED PROVIDER NOTE - PATIENT PORTAL LINK FT
You can access the FollowMyHealth Patient Portal offered by [facility] by registering at the following website: [email or web address]. By joining [employer]’s FollowMyHealth portal, you will also be able to view your health information using other applications (apps) compatible with our system.

## 2023-01-30 NOTE — DISCUSSION/SUMMARY
[EKG obtained to assist in diagnosis and management of assessed problem(s)] : EKG obtained to assist in diagnosis and management of assessed problem(s) [FreeTextEntry1] : 1 - Syncope:  presents today for follow up evaluation of 3 recent syncopal episodes.  All episodes were witnessed by the patient's .  The first episode occurred last Thursday after dinner and having a Martini.  She had 2 episodes yesterday, at home on her way to and from the bathroom.  Patient states she felt lightheaded, weak and passed out.  Denies experiencing any chest pain, shortness of breath, palpitations.  Her  states that she was "very clammy" with every episode.  Patient continues to feel weak and becomes lightheaded when getting up.  Attempted to do orthostatic blood pressures, unable to do so as her blood pressure was low while laying supine.\par \par 2 - Patient with history of SVT and paroxysms of afib.  She is status-post ILR.  Device was interrogated today and failed to show any arrhythmias or marked bradycardia.\par \par 3 - Patient advised to go to the hospital for hydration and labs.  Ambulance called and patient will be transported to [facility] for further evaluation.\par \par 4 - Follow up in 3 weeks.

## 2023-01-31 LAB
CULTURE RESULTS: SIGNIFICANT CHANGE UP
SPECIMEN SOURCE: SIGNIFICANT CHANGE UP

## 2023-02-01 ENCOUNTER — APPOINTMENT (OUTPATIENT)
Dept: CARDIOLOGY | Facility: CLINIC | Age: 73
End: 2023-02-01
Payer: MEDICARE

## 2023-02-01 DIAGNOSIS — R55 SYNCOPE AND COLLAPSE: ICD-10-CM

## 2023-02-01 DIAGNOSIS — R53.1 WEAKNESS: ICD-10-CM

## 2023-02-01 DIAGNOSIS — Z90.89 ACQUIRED ABSENCE OF OTHER ORGANS: ICD-10-CM

## 2023-02-01 DIAGNOSIS — R63.0 ANOREXIA: ICD-10-CM

## 2023-02-01 DIAGNOSIS — K21.9 GASTRO-ESOPHAGEAL REFLUX DISEASE WITHOUT ESOPHAGITIS: ICD-10-CM

## 2023-02-01 DIAGNOSIS — F03.90 UNSPECIFIED DEMENTIA, UNSPECIFIED SEVERITY, WITHOUT BEHAVIORAL DISTURBANCE, PSYCHOTIC DISTURBANCE, MOOD DISTURBANCE, AND ANXIETY: ICD-10-CM

## 2023-02-01 DIAGNOSIS — Z20.822 CONTACT WITH AND (SUSPECTED) EXPOSURE TO COVID-19: ICD-10-CM

## 2023-02-01 DIAGNOSIS — C73 MALIGNANT NEOPLASM OF THYROID GLAND: ICD-10-CM

## 2023-02-01 PROCEDURE — 93298 REM INTERROG DEV EVAL SCRMS: CPT

## 2023-02-01 PROCEDURE — G2066: CPT

## 2023-03-01 ENCOUNTER — APPOINTMENT (OUTPATIENT)
Dept: CARDIOLOGY | Facility: CLINIC | Age: 73
End: 2023-03-01
Payer: MEDICARE

## 2023-03-01 ENCOUNTER — APPOINTMENT (OUTPATIENT)
Dept: CARDIOLOGY | Facility: CLINIC | Age: 73
End: 2023-03-01

## 2023-03-01 VITALS
BODY MASS INDEX: 37.21 KG/M2 | HEIGHT: 63 IN | HEART RATE: 71 BPM | WEIGHT: 210 LBS | SYSTOLIC BLOOD PRESSURE: 132 MMHG | DIASTOLIC BLOOD PRESSURE: 68 MMHG | RESPIRATION RATE: 16 BRPM

## 2023-03-01 DIAGNOSIS — R00.2 PALPITATIONS: ICD-10-CM

## 2023-03-01 PROCEDURE — 99214 OFFICE O/P EST MOD 30 MIN: CPT

## 2023-03-01 PROCEDURE — 93000 ELECTROCARDIOGRAM COMPLETE: CPT

## 2023-03-03 NOTE — ASSESSMENT
[FreeTextEntry1] : \par 1. EKG today revealed sinus rhythm at 71 bpm.  Normal  intervals.  Poor R-wave progression leads V1 through V3.  Non-specific ST-T changes.  \par \par 2. Recent syncope:  Suspect vasovagal physiology, possibly in association with dehydration.  Patient currently being evaluated by [facility] and is scheduled to undergo a tilt-table study.  At this time, I have advised Mrs. Rodriguez to complete her study with the other cardiology group prior to returning here for further evaluation.  If clinically stable, office visit three months. \par

## 2023-03-03 NOTE — REASON FOR VISIT
[FreeTextEntry1] : Mrs. Rodriguez is a pleasant 72-year-old white female with a past medical history significant for obesity, as well as palpitations associated with lightheadedness, noted SVT, and questionable parosyxms of atrial fibrillation as well as two syncopal episodes in recent weeks, who presents for follow up evaluation.  \par

## 2023-03-03 NOTE — HISTORY OF PRESENT ILLNESS
[FreeTextEntry1] : From a cardiac standpoint, Mrs. Rodriguez denies exertional chest pain and shortness of breath.  She has had no further syncope since her last visit here which was on January 30th.  At that time, the patient was felt to be dehydrated and was referred to [facility] for further evaluation.  Patient was hydrated and subsequently released.  She has since then obtained a second opinion from another cardiology group and is currently undergoing an extensive evaluation. \par  Unknown if ever smoked

## 2023-03-08 ENCOUNTER — APPOINTMENT (OUTPATIENT)
Dept: CARDIOLOGY | Facility: CLINIC | Age: 73
End: 2023-03-08
Payer: MEDICARE

## 2023-03-08 PROCEDURE — G2066: CPT

## 2023-03-08 PROCEDURE — 93298 REM INTERROG DEV EVAL SCRMS: CPT

## 2023-03-23 ENCOUNTER — APPOINTMENT (OUTPATIENT)
Dept: ELECTROPHYSIOLOGY | Facility: CLINIC | Age: 73
End: 2023-03-23

## 2023-04-12 ENCOUNTER — APPOINTMENT (OUTPATIENT)
Dept: CARDIOLOGY | Facility: CLINIC | Age: 73
End: 2023-04-12
Payer: MEDICARE

## 2023-04-12 PROCEDURE — G2066: CPT

## 2023-04-12 PROCEDURE — 93298 REM INTERROG DEV EVAL SCRMS: CPT

## 2023-05-17 ENCOUNTER — APPOINTMENT (OUTPATIENT)
Dept: CARDIOLOGY | Facility: CLINIC | Age: 73
End: 2023-05-17
Payer: MEDICARE

## 2023-05-17 PROCEDURE — 93298 REM INTERROG DEV EVAL SCRMS: CPT

## 2023-05-17 PROCEDURE — G2066: CPT

## 2023-06-07 ENCOUNTER — APPOINTMENT (OUTPATIENT)
Dept: CARDIOLOGY | Facility: CLINIC | Age: 73
End: 2023-06-07
Payer: MEDICARE

## 2023-06-07 VITALS
RESPIRATION RATE: 16 BRPM | BODY MASS INDEX: 37.21 KG/M2 | WEIGHT: 210 LBS | DIASTOLIC BLOOD PRESSURE: 84 MMHG | HEART RATE: 58 BPM | SYSTOLIC BLOOD PRESSURE: 128 MMHG | HEIGHT: 63 IN

## 2023-06-07 DIAGNOSIS — E03.9 HYPOTHYROIDISM, UNSPECIFIED: ICD-10-CM

## 2023-06-07 DIAGNOSIS — E78.00 PURE HYPERCHOLESTEROLEMIA, UNSPECIFIED: ICD-10-CM

## 2023-06-07 PROCEDURE — 93000 ELECTROCARDIOGRAM COMPLETE: CPT

## 2023-06-07 PROCEDURE — 99214 OFFICE O/P EST MOD 30 MIN: CPT

## 2023-06-07 RX ORDER — LEVOTHYROXINE SODIUM 100 UG/1
100 TABLET ORAL
Refills: 0 | Status: DISCONTINUED | COMMUNITY
End: 2023-06-07

## 2023-06-08 NOTE — HISTORY OF PRESENT ILLNESS
[FreeTextEntry1] : From a cardiac standpoint, Mrs. Rodriguez feels better following the addition of Fludrocortisone to her medical regimen. The patient had been experiencing considerable lightheadedness felt to be orthostatic in nature. She did not undergo a tilt table study and was empirically started on Fludrocortisone 0.1 mg b.i.d. She presents today for follow up. \par \par

## 2023-06-08 NOTE — REASON FOR VISIT
[FreeTextEntry1] : Mrs. Rodriguez is a pleasant 72-year-old white female with a past medical history significant for obesity, as well as palpitations associated with lightheadedness, noted SVT, and questionable paroxysms of atrial fibrillation as well as two syncopal episodes in recent weeks, who presents for follow up evaluation.  \par

## 2023-06-08 NOTE — ASSESSMENT
[FreeTextEntry1] : 1. EKG today reveals sinus bradycardia at 58 bpm. Normal intervals. Poor R-wave progression leads V1 through V3. No ischemic changes. \par \par 2. Orthostatic hypotension: Patient responding to Midodrine 0.1 mg b.i.d. Blood pressure is much better and patient feels “stronger.” She has developed 1-2+ bilateral pedal and pretibial edema since the implementation of Fludrocortisone. I will reduce her current dose to 0.1 mg daily and reevaluate her in approximately two weeks. \par \par 3. Review of recent bloodwork demonstrates normal renal function and hepatic function. No evidence of anemia. \par \par 4. Implantable loop recorder: No recent events noted. Will continue to monitor closely. \par \par 5. Given the above, the patient is advised to return in two weeks for reassessment.    \par

## 2023-06-15 ENCOUNTER — APPOINTMENT (OUTPATIENT)
Dept: ELECTROPHYSIOLOGY | Facility: CLINIC | Age: 73
End: 2023-06-15
Payer: MEDICARE

## 2023-06-15 VITALS
BODY MASS INDEX: 36.32 KG/M2 | HEIGHT: 63 IN | SYSTOLIC BLOOD PRESSURE: 128 MMHG | TEMPERATURE: 97.7 F | OXYGEN SATURATION: 100 % | HEART RATE: 57 BPM | DIASTOLIC BLOOD PRESSURE: 75 MMHG | WEIGHT: 205 LBS

## 2023-06-15 PROCEDURE — 93000 ELECTROCARDIOGRAM COMPLETE: CPT

## 2023-06-15 PROCEDURE — 99214 OFFICE O/P EST MOD 30 MIN: CPT

## 2023-06-15 NOTE — DISCUSSION/SUMMARY
[FreeTextEntry1] : Ms. Rodriguez is a 71 y/f with history of thyroid CA, with recurrent episodes of palpitations, lightheadedness, and syncope. An event monitor 12/2020 revealed brief episodes of SVT, and possible brief PAF, which did not correlate with symptoms. She is now s/p ILR implant for ongoing arrhythmia monitoring to correlate with symptoms, evaluate for arrhythmic etiology of syncope, and guide further management on 4/5/21. \par \par Remote followed by Dr. De. ILR has revealed many symptom recordings of lightheadedness have correlated with SR with rare APCs. On two occasions there have been brief bursts of probable PAT one slightly irregular cannot rule out PAF- max 3 seconds in duration. \par \par During f/u with Dr. De she reported that her ILR was migrating downward and beginning to bother her. During previous f/u she reported mild tenderness to ILR site and due to benefits of ongoing monitoring she wished to defer explant. \par \par Since last follow up she was hospitalized at [facility] for recurrent syncope which mostly occurred at night after ambulating to the bathroom and was noted to be orthostatic. She did not undergo TTT and was empirically started on midodrine which was changed to fludrocortisone which has improved her symptoms. \par \par Due to c/o LE edema dosage was reduced to once daily. SHe has not experienced any recurrent lightheadedness, dizziness, or syncope since dose adjusted. ILR has revealed no correlating arrhythmias. \par \par Recommendation: \par \par Ms. Rodriguez was recently diagnosed with orthostatic hypotension responding well to fludrocortisone. Dose recently reduced by Dr. De with no recurrent symptoms. Prior episodes of syncope with no correlating arrhythmias on ILR. To continue remote ILR Monitoring with Dr. De. To follow up with EP on an as needed basis. \par \par Danielle Soliz ANP-C \par  [EKG obtained to assist in diagnosis and management of assessed problem(s)] : EKG obtained to assist in diagnosis and management of assessed problem(s)

## 2023-06-15 NOTE — HISTORY OF PRESENT ILLNESS
[FreeTextEntry1] : Ms. Rodriguez is a 71 y/f with history of thyroid CA, with recurrent episodes of palpitations, lightheadedness, and syncope. An event monitor 12/2020 revealed brief episodes of SVT, and possible brief PAF, which did not correlate with symptoms. She is now s/p ILR implant for ongoing arrhythmia monitoring to correlate with symptoms, evaluate for arrhythmic etiology of syncope, and guide further management on 4/5/21. \par \par Remote followed by Dr. De. ILR has revealed many symptom recordings of lightheadedness have correlated with SR with rare APCs. On two occasions there have been brief bursts of probable PAT one slightly irregular cannot rule out PAF- max 3 seconds in duration. \par \par During f/u with Dr. De she reported that her ILR was migrating downward and beginning to bother her. During previous f/u she reported mild tenderness to ILR site and due to benefits of ongoing monitoring she wished to defer explant. \par \par Since last follow up she was hospitalized at [facility] for recurrent syncope which mostly occurred at night after ambulating to the bathroom and was noted to be orthostatic. She did not undergo TTT and was empirically started on midodrine which was changed to fludrocortisone which has improved her symptoms. \par \par Due to c/o LE edema dosage was reduced to once daily. SHe has not experienced any recurrent lightheadedness, dizziness, or syncope since dose adjusted. ILR has revealed no correlating arrhythmias. \par \par

## 2023-06-15 NOTE — REVIEW OF SYSTEMS
[Feeling Fatigued] : not feeling fatigued [SOB] : no shortness of breath [Dyspnea on exertion] : not dyspnea during exertion [Palpitations] : no palpitations [Syncope] : syncope [Dizziness] : dizziness

## 2023-06-21 ENCOUNTER — APPOINTMENT (OUTPATIENT)
Dept: CARDIOLOGY | Facility: CLINIC | Age: 73
End: 2023-06-21
Payer: MEDICARE

## 2023-06-21 VITALS
RESPIRATION RATE: 16 BRPM | HEIGHT: 63 IN | SYSTOLIC BLOOD PRESSURE: 144 MMHG | DIASTOLIC BLOOD PRESSURE: 82 MMHG | BODY MASS INDEX: 35.97 KG/M2 | WEIGHT: 203 LBS | HEART RATE: 53 BPM

## 2023-06-21 PROCEDURE — 99214 OFFICE O/P EST MOD 30 MIN: CPT

## 2023-06-21 PROCEDURE — G2066: CPT

## 2023-06-21 PROCEDURE — 93298 REM INTERROG DEV EVAL SCRMS: CPT

## 2023-06-21 RX ORDER — LEVOTHYROXINE SODIUM 0.12 MG/1
125 TABLET ORAL DAILY
Refills: 0 | Status: ACTIVE | COMMUNITY

## 2023-06-21 NOTE — DISCUSSION/SUMMARY
[FreeTextEntry1] : 1 - Orthostatic hypotension:  Today's initial pressure 144/82, repeat pressure 134/80.  Feeling great.  This morning at home systolic pressure was 103 and she felt a bit fatigued.  Mr. Rodriguez has been taking her pressure in the mornings and the evenings and her pressure runs well in the mornings and then drops in the evenings.  He is concerned that if she doesn't take the evening dose she will have another syncopal episode because of her drop in pressure.   Has not had any lightheadedness lately.  She has only noticed a very slight difference in her bilateral lower extremity edema with the decrease of fludrocortisone 0.1mg to once daily.  States she had edema prior to starting the fludrocortisone.   Suggested switching from fludrocortisone to midodrine.  Mr. Rodriguez would prefer to keep her on the fludrocortisone.  He will continue to monitor her pressure in the mornings and the evenings.  He will give her the second dose if needed.  Should they notice that her edema is worsening, they will call us and we will make to switch to midodrine.\par \par 2 - Patient has follow up visit scheduled with Dr. De on 9/15/2023.\par

## 2023-06-21 NOTE — REVIEW OF SYSTEMS
[Negative] : Heme/Lymph [Weight Loss (___ Lbs)] : [unfilled] ~Ulb weight loss [Lower Ext Edema] : lower extremity edema

## 2023-06-21 NOTE — HISTORY OF PRESENT ILLNESS
[FreeTextEntry1] : Mrs. Rodriguez presents today feeling well.  Has not had any lightheadedness lately.  She has only noticed a very slight difference in her bilateral lower extremity edema with the decrease of fludrocortisone 0.1mg to once daily.  States she had edema prior to starting the fludrocortisone.  Her blood pressure today is well controlled.  Mr. Rodriguez has been taking her pressure in the mornings and the evenings and her pressure runs well in the mornings and then drops in the evenings.  He is concerned that if she doesn't take the evening dose she will have another syncopal episode because of her drop in pressure.

## 2023-06-21 NOTE — PHYSICAL EXAM
[Well Developed] : well developed [Well Nourished] : well nourished [No Acute Distress] : no acute distress [Normal Conjunctiva] : normal conjunctiva [Normal Venous Pressure] : normal venous pressure [No Carotid Bruit] : no carotid bruit [Normal S1, S2] : normal S1, S2 [No Murmur] : no murmur [No Rub] : no rub [No Gallop] : no gallop [Clear Lung Fields] : clear lung fields [No Respiratory Distress] : no respiratory distress  [Soft] : abdomen soft [Normal Bowel Sounds] : normal bowel sounds [Normal Gait] : normal gait [No Rash] : no rash [No Skin Lesions] : no skin lesions [Moves all extremities] : moves all extremities [No Focal Deficits] : no focal deficits [Normal Speech] : normal speech [Alert and Oriented] : alert and oriented [Normal memory] : normal memory [Obese] : obese [de-identified] : 1+ bilateral LE edema

## 2023-07-27 ENCOUNTER — APPOINTMENT (OUTPATIENT)
Dept: CARDIOLOGY | Facility: CLINIC | Age: 73
End: 2023-07-27
Payer: MEDICARE

## 2023-07-27 PROCEDURE — G2066: CPT

## 2023-07-27 PROCEDURE — 93298 REM INTERROG DEV EVAL SCRMS: CPT

## 2023-08-09 ENCOUNTER — APPOINTMENT (OUTPATIENT)
Dept: CARDIOLOGY | Facility: CLINIC | Age: 73
End: 2023-08-09
Payer: MEDICARE

## 2023-08-09 VITALS
DIASTOLIC BLOOD PRESSURE: 64 MMHG | HEART RATE: 69 BPM | SYSTOLIC BLOOD PRESSURE: 110 MMHG | BODY MASS INDEX: 36.99 KG/M2 | WEIGHT: 201 LBS | HEIGHT: 62 IN | RESPIRATION RATE: 16 BRPM

## 2023-08-09 DIAGNOSIS — R55 SYNCOPE AND COLLAPSE: ICD-10-CM

## 2023-08-09 DIAGNOSIS — I95.9 HYPOTENSION, UNSPECIFIED: ICD-10-CM

## 2023-08-09 DIAGNOSIS — I47.1 SUPRAVENTRICULAR TACHYCARDIA: ICD-10-CM

## 2023-08-09 DIAGNOSIS — R42 DIZZINESS AND GIDDINESS: ICD-10-CM

## 2023-08-09 PROCEDURE — 99214 OFFICE O/P EST MOD 30 MIN: CPT

## 2023-08-09 PROCEDURE — 93000 ELECTROCARDIOGRAM COMPLETE: CPT

## 2023-08-09 RX ORDER — MIDODRINE HYDROCHLORIDE 5 MG/1
5 TABLET ORAL
Refills: 0 | Status: ACTIVE | COMMUNITY
Start: 2023-08-09

## 2023-08-09 RX ORDER — FLUDROCORTISONE ACETATE 0.1 MG/1
0.1 TABLET ORAL
Qty: 90 | Refills: 3 | Status: ACTIVE | COMMUNITY

## 2023-08-09 RX ORDER — DONEPEZIL HYDROCHLORIDE 5 MG/1
5 TABLET ORAL
Refills: 0 | Status: DISCONTINUED | COMMUNITY
End: 2023-08-09

## 2023-08-09 RX ORDER — DONEPEZIL HYDROCHLORIDE 10 MG/1
10 TABLET ORAL DAILY
Refills: 0 | Status: ACTIVE | COMMUNITY

## 2023-08-12 PROBLEM — R55 SYNCOPE AND COLLAPSE: Status: ACTIVE | Noted: 2018-01-23

## 2023-08-12 PROBLEM — I47.1 SVT (SUPRAVENTRICULAR TACHYCARDIA): Status: ACTIVE | Noted: 2021-01-15

## 2023-08-12 PROBLEM — I95.9 HYPOTENSION: Status: ACTIVE | Noted: 2023-01-30

## 2023-08-12 PROBLEM — R42 INTERMITTENT LIGHTHEADEDNESS: Status: ACTIVE | Noted: 2020-02-19

## 2023-08-16 NOTE — REASON FOR VISIT
[FreeTextEntry1] : Mrs. Rodriguez is a pleasant 72-year-old white female with a past medical history significant for obesity, as well as palpitations associated with lightheadedness, noted SVT, and questionable paroxysms of atrial fibrillation as well as two syncopal episodes in recent weeks, who presents for follow up evaluation.

## 2023-08-16 NOTE — ASSESSMENT
[FreeTextEntry1] : 1. EKG today reveals normal sinus rhythm with sinus arrhythmia at 69 bpm. Normal intervals. No evidence of ischemia.   2. Syncope: Patient currently on both Midodrine and Fludrocortisone. The latter reduced to once daily due to peripheral edema. Patient also has a loop recorder implanted which, to date, has failed to reveal a significant arrhythmia correlating with her lightheadedness. Patient did have a syncopal episode requiring evaluation at [facility], but it was felt that represented vasovagal physiology. Since that time, she was started on medications without further episodes. I have recommended continuation of medications as well as a liberal salt diet. If clinically stable, will reevaluate in approximately three months.

## 2023-08-16 NOTE — HISTORY OF PRESENT ILLNESS
[FreeTextEntry1] : Mrs. Rodriguez presents today for a follow-up evaluation. She has had no further syncope but continues to experience periods of lightheadedness which her  feels are secondary to low blood pressure. She is currently being treated with Midodrine and Fludrocortisone.

## 2023-08-16 NOTE — PHYSICAL EXAM
[Well Developed] : well developed [Well Nourished] : well nourished [No Acute Distress] : no acute distress [Obese] : obese [Normal Conjunctiva] : normal conjunctiva [Normal Venous Pressure] : normal venous pressure [No Carotid Bruit] : no carotid bruit [Normal S1, S2] : normal S1, S2 [No Murmur] : no murmur [No Rub] : no rub [No Gallop] : no gallop [Clear Lung Fields] : clear lung fields [Soft] : abdomen soft [No Respiratory Distress] : no respiratory distress  [Normal Bowel Sounds] : normal bowel sounds [Normal Gait] : normal gait [No Rash] : no rash [No Skin Lesions] : no skin lesions [Moves all extremities] : moves all extremities [No Focal Deficits] : no focal deficits [Normal Speech] : normal speech [Alert and Oriented] : alert and oriented [Normal memory] : normal memory [de-identified] : 1+ bilateral LE edema

## 2023-08-30 ENCOUNTER — APPOINTMENT (OUTPATIENT)
Dept: CARDIOLOGY | Facility: CLINIC | Age: 73
End: 2023-08-30
Payer: MEDICARE

## 2023-08-30 PROCEDURE — G2066: CPT

## 2023-08-30 PROCEDURE — 93298 REM INTERROG DEV EVAL SCRMS: CPT

## 2023-09-15 ENCOUNTER — APPOINTMENT (OUTPATIENT)
Dept: CARDIOLOGY | Facility: CLINIC | Age: 73
End: 2023-09-15

## 2025-06-18 NOTE — ED PROVIDER NOTE - TOBACCO USE
I attest my time as attending is greater than 50% of the total combined time spent on qualifying patient care activities by the PA/NP and attending.
I attest my time as attending is greater than 50% of the total combined time spent on qualifying patient care activities by the PA/NP and attending.
Never smoker